# Patient Record
Sex: FEMALE | Race: WHITE | Employment: OTHER
[De-identification: names, ages, dates, MRNs, and addresses within clinical notes are randomized per-mention and may not be internally consistent; named-entity substitution may affect disease eponyms.]

---

## 2019-11-11 LAB
AVERAGE GLUCOSE: 298
CHOLESTEROL, TOTAL: 205 MG/DL
CHOLESTEROL/HDL RATIO: 4.3
CREATININE, URINE: 250.27
CREATININE: 0.7 MG/DL
HBA1C MFR BLD: 12 %
HDLC SERPL-MCNC: 48 MG/DL (ref 35–70)
LDL CHOLESTEROL CALCULATED: 118 MG/DL (ref 0–160)
MICROALBUMIN/CREAT 24H UR: 16.9 MG/G{CREAT}
MICROALBUMIN/CREAT UR-RTO: 67.5
POTASSIUM (K+): 4.4
TRIGL SERPL-MCNC: 194 MG/DL
VLDLC SERPL CALC-MCNC: 39 MG/DL

## 2020-05-12 VITALS
RESPIRATION RATE: 14 BRPM | WEIGHT: 187 LBS | DIASTOLIC BLOOD PRESSURE: 80 MMHG | HEART RATE: 78 BPM | SYSTOLIC BLOOD PRESSURE: 124 MMHG

## 2020-05-12 RX ORDER — SITAGLIPTIN AND METFORMIN HYDROCHLORIDE 1000; 50 MG/1; MG/1
1 TABLET, FILM COATED ORAL 2 TIMES DAILY WITH MEALS
COMMUNITY
End: 2020-11-19 | Stop reason: SDUPTHER

## 2020-05-12 RX ORDER — ALPRAZOLAM 0.5 MG/1
0.5 TABLET ORAL NIGHTLY PRN
COMMUNITY

## 2020-05-12 RX ORDER — SERTRALINE HYDROCHLORIDE 100 MG/1
100 TABLET, FILM COATED ORAL DAILY
COMMUNITY

## 2020-05-12 RX ORDER — METOPROLOL TARTRATE 50 MG/1
50 TABLET, FILM COATED ORAL 2 TIMES DAILY
COMMUNITY
End: 2021-04-01 | Stop reason: SDUPTHER

## 2020-05-12 RX ORDER — LISINOPRIL 2.5 MG/1
2.5 TABLET ORAL DAILY
COMMUNITY
End: 2020-11-19 | Stop reason: SDUPTHER

## 2020-05-12 RX ORDER — PRAVASTATIN SODIUM 20 MG
20 TABLET ORAL DAILY
COMMUNITY
End: 2020-11-19 | Stop reason: SDUPTHER

## 2020-05-12 RX ORDER — TRIAMTERENE AND HYDROCHLOROTHIAZIDE 37.5; 25 MG/1; MG/1
1 CAPSULE ORAL EVERY MORNING
COMMUNITY
End: 2020-11-19 | Stop reason: SDUPTHER

## 2020-11-19 RX ORDER — TRIAMTERENE AND HYDROCHLOROTHIAZIDE 37.5; 25 MG/1; MG/1
1 CAPSULE ORAL EVERY MORNING
Qty: 90 CAPSULE | Refills: 0 | Status: SHIPPED | OUTPATIENT
Start: 2020-11-19 | End: 2021-06-24 | Stop reason: SDUPTHER

## 2020-11-19 RX ORDER — PRAVASTATIN SODIUM 20 MG
20 TABLET ORAL DAILY
Qty: 90 TABLET | Refills: 0 | Status: SHIPPED | OUTPATIENT
Start: 2020-11-19 | End: 2021-03-04 | Stop reason: SDUPTHER

## 2020-11-19 RX ORDER — GLIPIZIDE 5 MG/1
5 TABLET, FILM COATED, EXTENDED RELEASE ORAL DAILY
COMMUNITY
End: 2020-11-19 | Stop reason: SDUPTHER

## 2020-11-19 RX ORDER — SITAGLIPTIN AND METFORMIN HYDROCHLORIDE 1000; 50 MG/1; MG/1
1 TABLET, FILM COATED ORAL DAILY
Qty: 90 TABLET | Refills: 0 | Status: SHIPPED | OUTPATIENT
Start: 2020-11-19 | End: 2021-04-12

## 2020-11-19 RX ORDER — LISINOPRIL 2.5 MG/1
2.5 TABLET ORAL DAILY
Qty: 90 TABLET | Refills: 0 | Status: SHIPPED | OUTPATIENT
Start: 2020-11-19 | End: 2021-03-04

## 2020-11-19 RX ORDER — GLIPIZIDE 5 MG/1
5 TABLET, FILM COATED, EXTENDED RELEASE ORAL DAILY
Qty: 90 TABLET | Refills: 0 | Status: SHIPPED | OUTPATIENT
Start: 2020-11-19 | End: 2021-03-04

## 2020-11-19 NOTE — TELEPHONE ENCOUNTER
pt sarah in needs to speak with someone about getting her medications refilled as soon as possible , would like to speak with Kwaku Tripathi if possible . . please contact pt [phone]

## 2021-02-08 ENCOUNTER — NURSE TRIAGE (OUTPATIENT)
Dept: OTHER | Facility: CLINIC | Age: 71
End: 2021-02-08

## 2021-02-08 ENCOUNTER — TELEPHONE (OUTPATIENT)
Dept: ADMINISTRATIVE | Age: 71
End: 2021-02-08

## 2021-02-08 DIAGNOSIS — R53.83 OTHER FATIGUE: ICD-10-CM

## 2021-02-08 DIAGNOSIS — R63.4 WEIGHT LOSS: ICD-10-CM

## 2021-02-08 DIAGNOSIS — E78.5 HYPERLIPIDEMIA, UNSPECIFIED HYPERLIPIDEMIA TYPE: ICD-10-CM

## 2021-02-08 DIAGNOSIS — E11.65 UNCONTROLLED TYPE 2 DIABETES MELLITUS WITH HYPERGLYCEMIA (HCC): ICD-10-CM

## 2021-02-08 DIAGNOSIS — I10 ESSENTIAL HYPERTENSION: Primary | ICD-10-CM

## 2021-02-08 NOTE — TELEPHONE ENCOUNTER
Pt called and scheduled an appt for 3/4. She said she is concerned with diabetes and does not feel right when taking Janumet twice a day. She is having dizziness, weight loss, shakiness, and has fallen. Her family is concerned. She said she has been seen within the past 2 years. Warm transfer to nurse triage. Nurse aware of appt scheduled.

## 2021-02-08 NOTE — TELEPHONE ENCOUNTER
Reason for Disposition   Pharmacy calling with prescription questions and triager unable to answer question   Patient sounds very sick or weak to the triager    Additional Information   Negative: Low blood glucose (< 70 mg/dL or 3.9 mmol/L) persists > 30 minutes AND using low blood sugar Care Advice     Unable to take sugar, Pt is not experiencing any signs or symptoms that she is concerned of.  Negative: Low blood sugar symptoms persist > 30 minutes AND using low blood sugar Care Advice     Unable to take sugar, Pt is not experiencing any signs or symptoms that she is concerned of. Answer Assessment - Initial Assessment Questions  1. NAME of MEDICATION: \"What medicine are you calling about? \"      Jennifer Devi. 2. QUESTION: Mayur Alonso is your question? \"      Pt has stopped taking two pills as prescribed because\" she states it makes her feel like she is in [place]. \" She hasn't had her sugar checked in slow long and wants to know if these prescription be changed. She made it clear she cannot afford insulin. 3. PRESCRIBING HCP: \"Who prescribed it? \" Reason: if prescribed by specialist, call should be referred to that group. Oralia Lehman MD.    4. SYMPTOMS: \"Do you have any symptoms? \"      Confused, dizziness, has fallen twice once looking under her car and once at the grocery store (unsure if its related to her medication janumet), gets very sleepy. \"Everything seems to have gone wrong. \"    5. SEVERITY: If symptoms are present, ask \"Are they mild, moderate or severe? \"      Moderate. 6. PREGNANCY:  \"Is there any chance that you are pregnant? \" \"When was your last menstrual period? \"      N/A    Answer Assessment - Initial Assessment Questions  1. SYMPTOMS: \"What symptoms are you concerned about? \"      Confusion, unsteady gait, and dizziness associated with taking her medications as prescribed. 2. ONSET:  \"When did the symptoms start? \"  This has been going on for a while and has gotten worse within the last 7-5 months. 3. BLOOD GLUCOSE: \"What is your blood glucose level? \"       Unable to take blood sugar right now. 4. USUAL RANGE: \"What is your blood glucose level usually? \" (e.g., usual fasting morning value, usual evening value)      Unsure. 5. TYPE 1 or 2:  \"Do you know what type of diabetes you have? \"  (e.g., Type 1, Type 2, Gestational; doesn't know)       Type 2.    6. INSULIN: \"Do you take insulin? \" \"What type of insulin(s) do you use? What is the mode of delivery? (syringe, pen; injection or pump) \"When did you last give yourself an insulin dose? \" (i.e., time or hours/minutes ago) \"How much did you give? \" (i.e., how many units)      N/A    7. DIABETES PILLS: \"Do you take any pills for your diabetes? \"      Janumet PO 2 tablets, glipizide. She is only taking one of the Janumet and has stopped taking glipizide. This is due to her concern of low blood sugar and not being able to afford it. 8. OTHER SYMPTOMS: \"Do you have any symptoms? \" (e.g., fever, frequent urination, difficulty breathing, vomiting)      Frequently peeing more than usual. Bottoms of her feet are numb. Her fingers feel like they are tingling. 9. LOW BLOOD GLUCOSE TREATMENT: \"What have you done so far to treat the low blood glucose level? \"      Is unsure of what her sugar is currently, not experiencing symptoms. 10. FOOD: \"When did you last eat or drink? \"        \"Ate an hour ago. \"    11. ALONE: Nadia Bill you alone right now or is someone with you? \"         States she is alone. Protocols used: MEDICATION QUESTION CALL-ADULT-[place], DIABETES - LOW BLOOD SUGAR-ADULT-[place]    Patient called Meaghan Fields at [facility] pre-service center [facility])  with red flag complaint. Brief description of triage: Medication quest, low blood sugar concern. Pt unable to take her blood sugar, as she doesn't have a glucometer or access to one.     Triage indicates for patient to [address] or office with PCP approval. Anika Miller NP made aware of situation and that Pt is unable to drive. NP recommended Pt be seen tomorrow by her PCP. Pt is agreeable to this plan. Care advice provided, patient verbalizes understanding; denies any other questions or concerns; instructed to call back for any new or worsening symptoms. Writer sent secure message to [facility] to schedule    Attention Provider: Thank you for allowing me to participate in the care of your patient. The patient was connected to triage in response to information provided to the [facility]. Please do not respond through this encounter as the response is not directed to a shared pool.

## 2021-02-09 DIAGNOSIS — E78.5 HYPERLIPIDEMIA, UNSPECIFIED HYPERLIPIDEMIA TYPE: ICD-10-CM

## 2021-02-09 DIAGNOSIS — R63.4 WEIGHT LOSS: ICD-10-CM

## 2021-02-09 DIAGNOSIS — E11.65 UNCONTROLLED TYPE 2 DIABETES MELLITUS WITH HYPERGLYCEMIA (HCC): ICD-10-CM

## 2021-02-09 DIAGNOSIS — R53.83 OTHER FATIGUE: ICD-10-CM

## 2021-02-09 LAB
ALBUMIN SERPL-MCNC: 4.1 G/DL (ref 3.5–5.2)
ALP BLD-CCNC: 98 U/L (ref 35–104)
ALT SERPL-CCNC: 23 U/L (ref 0–32)
ANION GAP SERPL CALCULATED.3IONS-SCNC: 14 MMOL/L (ref 7–16)
AST SERPL-CCNC: 27 U/L (ref 0–31)
BASOPHILS ABSOLUTE: 0.06 E9/L (ref 0–0.2)
BASOPHILS RELATIVE PERCENT: 0.8 % (ref 0–2)
BILIRUB SERPL-MCNC: 0.7 MG/DL (ref 0–1.2)
BUN BLDV-MCNC: 16 MG/DL (ref 8–23)
CALCIUM SERPL-MCNC: 10 MG/DL (ref 8.6–10.2)
CHLORIDE BLD-SCNC: 95 MMOL/L (ref 98–107)
CHOLESTEROL, TOTAL: 204 MG/DL (ref 0–199)
CO2: 24 MMOL/L (ref 22–29)
CREAT SERPL-MCNC: 0.5 MG/DL (ref 0.5–1)
EOSINOPHILS ABSOLUTE: 0.12 E9/L (ref 0.05–0.5)
EOSINOPHILS RELATIVE PERCENT: 1.6 % (ref 0–6)
GFR AFRICAN AMERICAN: >60
GFR NON-AFRICAN AMERICAN: >60 ML/MIN/1.73
GLUCOSE BLD-MCNC: 386 MG/DL (ref 74–99)
HBA1C MFR BLD: 12.3 % (ref 4–5.6)
HCT VFR BLD CALC: 46.8 % (ref 34–48)
HDLC SERPL-MCNC: 56 MG/DL
HEMOGLOBIN: 15.5 G/DL (ref 11.5–15.5)
IMMATURE GRANULOCYTES #: 0.03 E9/L
IMMATURE GRANULOCYTES %: 0.4 % (ref 0–5)
LDL CHOLESTEROL CALCULATED: 126 MG/DL (ref 0–99)
LYMPHOCYTES ABSOLUTE: 2.25 E9/L (ref 1.5–4)
LYMPHOCYTES RELATIVE PERCENT: 30.5 % (ref 20–42)
MCH RBC QN AUTO: 30.8 PG (ref 26–35)
MCHC RBC AUTO-ENTMCNC: 33.1 % (ref 32–34.5)
MCV RBC AUTO: 93 FL (ref 80–99.9)
MONOCYTES ABSOLUTE: 0.79 E9/L (ref 0.1–0.95)
MONOCYTES RELATIVE PERCENT: 10.7 % (ref 2–12)
NEUTROPHILS ABSOLUTE: 4.13 E9/L (ref 1.8–7.3)
NEUTROPHILS RELATIVE PERCENT: 56 % (ref 43–80)
PDW BLD-RTO: 12.3 FL (ref 11.5–15)
PLATELET # BLD: 161 E9/L (ref 130–450)
PMV BLD AUTO: 11.2 FL (ref 7–12)
POTASSIUM SERPL-SCNC: 4.6 MMOL/L (ref 3.5–5)
RBC # BLD: 5.03 E12/L (ref 3.5–5.5)
SODIUM BLD-SCNC: 133 MMOL/L (ref 132–146)
TOTAL PROTEIN: 8 G/DL (ref 6.4–8.3)
TRIGL SERPL-MCNC: 112 MG/DL (ref 0–149)
VLDLC SERPL CALC-MCNC: 22 MG/DL
WBC # BLD: 7.4 E9/L (ref 4.5–11.5)

## 2021-02-09 NOTE — TELEPHONE ENCOUNTER
Spoke to the patient, she is very noncompliant, she has not been seen for more than 1 year  Has not followed up has not had a blood work done, I told her if she feels worse go to the ER  I ordered blood work for her she promised she will do it today  If the sugar is dropping then she can cut back the Janumet to 1 a day  She has an office visit keep that go to ER or urgent care if worse be more compliant with follow-ups  I spoke to her for long time and all the question answered

## 2021-02-09 NOTE — TELEPHONE ENCOUNTER
Called patient to schedule to be seen per Nurse Triage. Patient stated it will be hard to get in today. She does need this appointment per Nurse Triage due to not feeling well. She needs to discuss medication and Hypoglycemia. Please contact patient.

## 2021-02-10 LAB — TSH SERPL DL<=0.05 MIU/L-ACNC: 2.1 UIU/ML (ref 0.27–4.2)

## 2021-02-11 ENCOUNTER — TELEPHONE (OUTPATIENT)
Dept: FAMILY MEDICINE CLINIC | Age: 71
End: 2021-02-11

## 2021-02-11 RX ORDER — INSULIN GLARGINE 100 [IU]/ML
10 INJECTION, SOLUTION SUBCUTANEOUS NIGHTLY
Qty: 5 PEN | Refills: 0 | Status: SHIPPED
Start: 2021-02-11 | End: 2021-07-26 | Stop reason: SDUPTHER

## 2021-02-11 NOTE — TELEPHONE ENCOUNTER
Patient called back she will be home all evening please call     [phone]    Last seen Visit date not found  Next appt 3/4/2021

## 2021-02-11 NOTE — TELEPHONE ENCOUNTER
I spoke to the patient A1c more than 12% start on Lantus 10 units at night prescription [facility] to [facility] she has a sister who can teach her who has been using that I offered her to have diabetic teaching at [address] [place] [address] she has no way to go there

## 2021-03-04 ENCOUNTER — OFFICE VISIT (OUTPATIENT)
Dept: FAMILY MEDICINE CLINIC | Age: 71
End: 2021-03-04
Payer: MEDICARE

## 2021-03-04 VITALS
HEART RATE: 78 BPM | WEIGHT: 167 LBS | BODY MASS INDEX: 26.84 KG/M2 | SYSTOLIC BLOOD PRESSURE: 130 MMHG | HEIGHT: 66 IN | OXYGEN SATURATION: 98 % | DIASTOLIC BLOOD PRESSURE: 80 MMHG

## 2021-03-04 DIAGNOSIS — Z91.81 AT HIGH RISK FOR FALLS: ICD-10-CM

## 2021-03-04 DIAGNOSIS — R63.4 WEIGHT LOSS: ICD-10-CM

## 2021-03-04 DIAGNOSIS — E11.65 UNCONTROLLED TYPE 2 DIABETES MELLITUS WITH HYPERGLYCEMIA (HCC): Primary | ICD-10-CM

## 2021-03-04 DIAGNOSIS — Z91.14 DRUG NONCOMPLIANCE: ICD-10-CM

## 2021-03-04 DIAGNOSIS — L73.9 FOLLICULITIS: ICD-10-CM

## 2021-03-04 DIAGNOSIS — E78.5 HYPERLIPIDEMIA, UNSPECIFIED HYPERLIPIDEMIA TYPE: ICD-10-CM

## 2021-03-04 DIAGNOSIS — F33.9 EPISODE OF RECURRENT MAJOR DEPRESSIVE DISORDER, UNSPECIFIED DEPRESSION EPISODE SEVERITY (HCC): ICD-10-CM

## 2021-03-04 DIAGNOSIS — I10 ESSENTIAL HYPERTENSION: ICD-10-CM

## 2021-03-04 PROCEDURE — G8400 PT W/DXA NO RESULTS DOC: HCPCS | Performed by: INTERNAL MEDICINE

## 2021-03-04 PROCEDURE — G8484 FLU IMMUNIZE NO ADMIN: HCPCS | Performed by: INTERNAL MEDICINE

## 2021-03-04 PROCEDURE — 3046F HEMOGLOBIN A1C LEVEL >9.0%: CPT | Performed by: INTERNAL MEDICINE

## 2021-03-04 PROCEDURE — G8417 CALC BMI ABV UP PARAM F/U: HCPCS | Performed by: INTERNAL MEDICINE

## 2021-03-04 PROCEDURE — G8427 DOCREV CUR MEDS BY ELIG CLIN: HCPCS | Performed by: INTERNAL MEDICINE

## 2021-03-04 PROCEDURE — 1090F PRES/ABSN URINE INCON ASSESS: CPT | Performed by: INTERNAL MEDICINE

## 2021-03-04 PROCEDURE — 4040F PNEUMOC VAC/ADMIN/RCVD: CPT | Performed by: INTERNAL MEDICINE

## 2021-03-04 PROCEDURE — 1123F ACP DISCUSS/DSCN MKR DOCD: CPT | Performed by: INTERNAL MEDICINE

## 2021-03-04 PROCEDURE — 3017F COLORECTAL CA SCREEN DOC REV: CPT | Performed by: INTERNAL MEDICINE

## 2021-03-04 PROCEDURE — 99215 OFFICE O/P EST HI 40 MIN: CPT | Performed by: INTERNAL MEDICINE

## 2021-03-04 PROCEDURE — 2022F DILAT RTA XM EVC RTNOPTHY: CPT | Performed by: INTERNAL MEDICINE

## 2021-03-04 PROCEDURE — 1036F TOBACCO NON-USER: CPT | Performed by: INTERNAL MEDICINE

## 2021-03-04 RX ORDER — BLOOD-GLUCOSE METER
1 KIT MISCELLANEOUS DAILY
Qty: 1 KIT | Refills: 0 | Status: SHIPPED | OUTPATIENT
Start: 2021-03-04

## 2021-03-04 RX ORDER — LISINOPRIL 5 MG/1
5 TABLET ORAL DAILY
Qty: 90 TABLET | Refills: 1 | Status: SHIPPED
Start: 2021-03-04 | End: 2021-06-24 | Stop reason: SDUPTHER

## 2021-03-04 RX ORDER — PEN NEEDLE, DIABETIC 31 GX5/16"
1 NEEDLE, DISPOSABLE MISCELLANEOUS DAILY
Qty: 100 EACH | Refills: 3 | Status: SHIPPED
Start: 2021-03-04 | End: 2021-07-26 | Stop reason: SDUPTHER

## 2021-03-04 RX ORDER — PRAVASTATIN SODIUM 20 MG
20 TABLET ORAL DAILY
Qty: 90 TABLET | Refills: 1 | Status: SHIPPED
Start: 2021-03-04 | End: 2021-06-24 | Stop reason: SDUPTHER

## 2021-03-04 RX ORDER — DOXYCYCLINE HYCLATE 100 MG
100 TABLET ORAL 2 TIMES DAILY
Qty: 20 TABLET | Refills: 0 | Status: SHIPPED | OUTPATIENT
Start: 2021-03-04 | End: 2021-03-14

## 2021-03-04 RX ORDER — LANCETS 30 GAUGE
1 EACH MISCELLANEOUS 2 TIMES DAILY
Qty: 200 EACH | Refills: 1 | Status: SHIPPED
Start: 2021-03-04 | End: 2021-08-02 | Stop reason: SDUPTHER

## 2021-03-04 RX ORDER — GLUCOSAMINE HCL/CHONDROITIN SU 500-400 MG
CAPSULE ORAL
Qty: 200 STRIP | Refills: 1 | Status: SHIPPED
Start: 2021-03-04 | End: 2021-08-26 | Stop reason: SDUPTHER

## 2021-03-04 SDOH — ECONOMIC STABILITY: TRANSPORTATION INSECURITY
IN THE PAST 12 MONTHS, HAS THE LACK OF TRANSPORTATION KEPT YOU FROM MEDICAL APPOINTMENTS OR FROM GETTING MEDICATIONS?: NO

## 2021-03-04 SDOH — ECONOMIC STABILITY: INCOME INSECURITY: HOW HARD IS IT FOR YOU TO PAY FOR THE VERY BASICS LIKE FOOD, HOUSING, MEDICAL CARE, AND HEATING?: NOT ASKED

## 2021-03-04 SDOH — ECONOMIC STABILITY: FOOD INSECURITY: WITHIN THE PAST 12 MONTHS, YOU WORRIED THAT YOUR FOOD WOULD RUN OUT BEFORE YOU GOT MONEY TO BUY MORE.: NEVER TRUE

## 2021-03-04 SDOH — ECONOMIC STABILITY: TRANSPORTATION INSECURITY
IN THE PAST 12 MONTHS, HAS LACK OF TRANSPORTATION KEPT YOU FROM MEETINGS, WORK, OR FROM GETTING THINGS NEEDED FOR DAILY LIVING?: NO

## 2021-03-04 ASSESSMENT — PATIENT HEALTH QUESTIONNAIRE - PHQ9
2. FEELING DOWN, DEPRESSED OR HOPELESS: 0
SUM OF ALL RESPONSES TO PHQ QUESTIONS 1-9: 0
SUM OF ALL RESPONSES TO PHQ QUESTIONS 1-9: 0
1. LITTLE INTEREST OR PLEASURE IN DOING THINGS: 0

## 2021-03-04 NOTE — PROGRESS NOTES
On the basis of positive falls risk screening, assessment and plan is as follows: home safety tips provided. Subjective:      This is a very noncompliant patient she does not follow-up in the office did not do the blood work last I saw was November 13, 2019 she did not come back since then    She does have depression she follows with a psychiatrist Dr. Kelvin Gibson she is on Zoloft and Wellbutrin    She did not do the blood work for long time finally she did the blood work which was ordered long time ago on February 9, 2021  Her A1c was 12.3 I called her and made the appointment for follow-up  She told me she had stopped some of the medication  She has stopped taking the cholesterol medication and the lisinopril    She came in today with her sister    I have talked to her on the phone few times she does not check her sugars at home sometimes the problem was discussed    She had a lesion on the kidney she had a CT abdomen pelvis December 2019 ordered by Dr. Murphy [place] scan report reviewed with the patient exophytic cyst lower pole left kidney patient denies any pain or hematuria she has not followed up with a urologist      She has been noncompliant for many years    She has noticed small ulcerated lesions on the back of the shoulder the arms she scratches them a lot    She has fallen once her foot got entangled in the carpet denies any dizziness no loss of balance       Chief Complaint   Patient presents with    Diabetes        Past Medical History:   Diagnosis Date    Hypertension     Noncompliance     Renal mass     Type 2 diabetes mellitus without complication ([facility] [address]     not controlled an noncompliant         Social History     Socioeconomic History    Marital status: Single     Spouse name: Not on file    Number of children: Not on file    Years of education: Not on file    Highest education level: Not on file   Occupational History    Not on file   Social Needs    Financial resource strain: Not on file    Food insecurity     Worry: Never true     Inability: Never true    Transportation needs     Medical: No     Non-medical: No   Tobacco Use    Smoking status: Never Smoker    Smokeless tobacco: Never Used   Substance and Sexual Activity    Alcohol use: Not Currently    Drug use: Never    Sexual activity: Not on file   Lifestyle    Physical activity     Days per week: Not on file     Minutes per session: Not on file    Stress: Not on file   Relationships    Social connections     Talks on phone: Not on file     Gets together: Not on file     Attends [employer] service: Not on file     Active member of club or organization: Not on file     Attends meetings of clubs or organizations: Not on file     Relationship status: Not on file    Intimate partner violence     Fear of current or ex partner: Not on file     Emotionally abused: Not on file     Physically abused: Not on file     Forced sexual activity: Not on file   Other Topics Concern    Not on file   Social History Narrative    Not on file        Past Surgical History:   Procedure Laterality Date    HYSTERECTOMY, TOTAL ABDOMINAL          Family History   Problem Relation Age of Onset    Other Mother         brain tumor  age 32        Allergies   Allergen Reactions    Glipizide      High doses cause side effects      Medrol [Methylprednisolone]      Raises blood pressure     Metformin And Related Diarrhea and Nausea Only     Only in high doses    Pcn [Penicillins]     Lipitor [Atorvastatin Calcium] Rash    Zocor [Simvastatin] Rash        ROS  No acute distress  Cardiac: Denies any chest pain or palpitation  No angina, no dyspnea, she is not very active she is mostly homebound  Respiratory: Denies any cough or shortness of breath, denies any chronic cough or night sweats  GI: No abdominal pain.  Denies any nausea vomiting or diarrhea has refused colonoscopy denies any blood in the stool no change in bowel habits  : Denies any dysuria frequency or hematuria  Complains of frequency urination  Neuro: No headache or dizziness  Endocrine: Diabetes type 2 not controlled because of noncompliance no follow-ups in the office does not take the medication does not watch the diet  Skin: Superficial excoriated areas on the back in the arm from scratching  Has lost 20 pounds of weight since her last visit  Denies any change in vision  Has seen ophthalmologist Dr. Savana Mc recently    Objective:    /80   Pulse 78   Ht 5' 6\" (1.676 m)   Wt 167 lb (75.8 kg)   SpO2 98%   BMI 26.95 kg/m²     Constitutional: Alert awake and oriented  Eyes: Pupils equal bilaterally. Extraocular muscles intact  Neck: no JVD adenopathy no bruit  No adenopathy, no thyromegaly  Heart:  RRR, no murmurs, gallops, or rubs. No S3 no gallop  Lungs:    no wheeze, rales or rhonchi  No crackles, good air entry bilaterally  Abd: bowel sounds present, nontender, nondistended, no masses no organomegaly  No masses felt no localized tenderness to deep palpation  Extrem:  No clubbing, cyanosis, or edema    Neuro: AAOx3,No Focal deficit  Psychological: no depression or anxiety   Feet exam diminished sensation plantar aspect of both feet  Pulses intact  No ulcers    Current Outpatient Medications   Medication Sig Dispense Refill    pravastatin (PRAVACHOL) 20 MG tablet Take 1 tablet by mouth daily 90 tablet 1    lisinopril (PRINIVIL;ZESTRIL) 5 MG tablet Take 1 tablet by mouth daily 90 tablet 1    doxycycline hyclate (VIBRA-TABS) 100 MG tablet Take 1 tablet by mouth 2 times daily for 10 days 20 tablet 0    mupirocin (BACTROBAN) 2 % ointment Apply 3 times daily. 1 Tube 1    Insulin Pen Needle (PEN NEEDLES 31GX5/16\") 31G X 8 MM MISC 1 each by Does not apply route daily 100 each 3    glucose monitoring kit (FREESTYLE) monitoring kit 1 kit by Does not apply route daily 1 kit 0    blood glucose monitor strips Test 2 times a day & as needed for symptoms of irregular blood glucose.  Dispense sufficient amount for indicated testing frequency plus additional to accommodate PRN testing needs. 200 strip 1    Lancets MISC 1 each by Does not apply route 2 times daily 200 each 1    insulin glargine (LANTUS SOLOSTAR) 100 UNIT/ML injection pen Inject 10 Units into the skin nightly 5 pen 0    sitaGLIPtan-metFORMIN (JANUMET)  MG per tablet Take 1 tablet by mouth daily 90 tablet 0    triamterene-hydroCHLOROthiazide (DYAZIDE) 37.5-25 MG per capsule Take 1 capsule by mouth every morning 90 capsule 0    metoprolol tartrate (LOPRESSOR) 50 MG tablet Take 50 mg by mouth 2 times daily      sertraline (ZOLOFT) 100 MG tablet Take 100 mg by mouth daily      buPROPion HCl (WELLBUTRIN XL PO) Take 100 mg by mouth 2 times daily      ALPRAZolam (XANAX) 0.5 MG tablet Take 0.5 mg by mouth nightly as needed for Sleep. No current facility-administered medications for this visit.          Last 3 BMP  Lab Results   Component Value Date/Time     02/09/2021 03:05 PM     02/27/2017 04:21 PM     07/18/2016 01:25 PM    K 4.6 02/09/2021 03:05 PM    K 4.4 11/11/2019    K 4.6 02/27/2017 04:21 PM    K 4.4 07/18/2016 01:25 PM    CL 95 (L) 02/09/2021 03:05 PM    CL 96 (L) 02/27/2017 04:21 PM    CL 98 07/18/2016 01:25 PM    CO2 24 02/09/2021 03:05 PM    CO2 25 02/27/2017 04:21 PM    CO2 26 07/18/2016 01:25 PM    BUN 16 02/09/2021 03:05 PM    BUN 17 02/27/2017 04:21 PM    BUN 14 07/18/2016 01:25 PM    CREATININE 0.5 02/09/2021 03:05 PM    CREATININE 0.7 11/11/2019    CREATININE 0.6 02/27/2017 04:21 PM    CREATININE 0.7 07/18/2016 01:25 PM    GLUCOSE 386 (H) 02/09/2021 03:05 PM    GLUCOSE 251 (H) 02/27/2017 04:21 PM    GLUCOSE 240 (H) 07/18/2016 01:25 PM    CALCIUM 10.0 02/09/2021 03:05 PM    CALCIUM 10.0 02/27/2017 04:21 PM    CALCIUM 9.9 07/18/2016 01:25 PM       Last 3 CMP:    Lab Results   Component Value Date/Time     02/09/2021 03:05 PM     02/27/2017 04:21 PM     07/18/2016 01:25 PM    K 4.6 02/09/2021 03:05 PM    K 4.4 11/11/2019    K 4.6 02/27/2017 04:21 PM    K 4.4 07/18/2016 01:25 PM    CL 95 (L) 02/09/2021 03:05 PM    CL 96 (L) 02/27/2017 04:21 PM    CL 98 07/18/2016 01:25 PM    CO2 24 02/09/2021 03:05 PM    CO2 25 02/27/2017 04:21 PM    CO2 26 07/18/2016 01:25 PM    BUN 16 02/09/2021 03:05 PM    BUN 17 02/27/2017 04:21 PM    BUN 14 07/18/2016 01:25 PM    CREATININE 0.5 02/09/2021 03:05 PM    CREATININE 0.7 11/11/2019    CREATININE 0.6 02/27/2017 04:21 PM    CREATININE 0.7 07/18/2016 01:25 PM    GLUCOSE 386 (H) 02/09/2021 03:05 PM    GLUCOSE 251 (H) 02/27/2017 04:21 PM    GLUCOSE 240 (H) 07/18/2016 01:25 PM    CALCIUM 10.0 02/09/2021 03:05 PM    CALCIUM 10.0 02/27/2017 04:21 PM    CALCIUM 9.9 07/18/2016 01:25 PM    PROT 8.0 02/09/2021 03:05 PM    PROT 7.8 02/27/2017 04:21 PM    PROT 7.6 07/18/2016 01:25 PM    LABALBU 4.1 02/09/2021 03:05 PM    LABALBU 4.2 02/27/2017 04:21 PM    LABALBU 4.1 07/18/2016 01:25 PM    BILITOT 0.7 02/09/2021 03:05 PM    BILITOT 0.7 02/27/2017 04:21 PM    BILITOT 0.8 07/18/2016 01:25 PM    ALKPHOS 98 02/09/2021 03:05 PM    ALKPHOS 63 02/27/2017 04:21 PM    ALKPHOS 55 07/18/2016 01:25 PM    AST 27 02/09/2021 03:05 PM    AST 38 (H) 02/27/2017 04:21 PM    AST 43 (H) 07/18/2016 01:25 PM    ALT 23 02/09/2021 03:05 PM    ALT 37 (H) 02/27/2017 04:21 PM    ALT 42 (H) 07/18/2016 01:25 PM        CBC:   Lab Results   Component Value Date/Time    WBC 7.4 02/09/2021 03:05 PM    RBC 5.03 02/09/2021 03:05 PM    HGB 15.5 02/09/2021 03:05 PM    HCT 46.8 02/09/2021 03:05 PM    MCV 93.0 02/09/2021 03:05 PM    MCH 30.8 02/09/2021 03:05 PM    MCHC 33.1 02/09/2021 03:05 PM    RDW 12.3 02/09/2021 03:05 PM     02/09/2021 03:05 PM    MPV 11.2 02/09/2021 03:05 PM       A1C:  Lab Results   Component Value Date/Time    LABA1C 12.3 (H) 02/09/2021 03:05 PM       Lipid panel:  Lab Results   Component Value Date    CHOL 204 02/09/2021    CHOL 205 11/11/2019    CHOL 191 02/27/2017    TRIG 112 02/09/2021    TRIG 194 11/11/2019    TRIG 270 02/27/2017    HDL 56 02/09/2021    HDL 48 11/11/2019    HDL 51 02/27/2017        Lab Results   Component Value Date/Time    PROT 8.0 02/09/2021 03:05 PM    PROT 7.8 02/27/2017 04:21 PM    PROT 7.6 07/18/2016 01:25 PM       No results found for: MG      Assessment. Beatrice Rocha was seen today for diabetes. Diagnoses and all orders for this visit:    Uncontrolled type 2 diabetes mellitus with hyperglycemia ([address]    At high risk for falls    Hyperlipidemia, unspecified hyperlipidemia type    Essential hypertension    Folliculitis    Weight loss    Episode of recurrent major depressive disorder, unspecified depression episode severity ([facility] [address]    Drug noncompliance    Other orders  -     pravastatin (PRAVACHOL) 20 MG tablet; Take 1 tablet by mouth daily  -     lisinopril (PRINIVIL;ZESTRIL) 5 MG tablet; Take 1 tablet by mouth daily  -     doxycycline hyclate (VIBRA-TABS) 100 MG tablet; Take 1 tablet by mouth 2 times daily for 10 days  -     mupirocin (BACTROBAN) 2 % ointment; Apply 3 times daily.  -     Insulin Pen Needle (PEN NEEDLES 31GX5/16\") 31G X 8 MM MISC; 1 each by Does not apply route daily  -     glucose monitoring kit (FREESTYLE) monitoring kit; 1 kit by Does not apply route daily  -     blood glucose monitor strips; Test 2 times a day & as needed for symptoms of irregular blood glucose. Dispense sufficient amount for indicated testing frequency plus additional to accommodate PRN testing needs. -     Lancets MISC; 1 each by Does not apply route 2 times daily       There is no problem list on file for this patient.       Plan: Diabetes not controlled because of noncompliance she stopped the medication, does not follow-up on the lab report, not watching the diet, not taking the medication as prescribed    She got Janumet recently take 1 a day  I added Lantus insulin 10 units at night  Diabetes teaching was done for long time  Complication of diabetes discussed in detail  Which includes blindness, renal failure, cardiovascular event, heart attacks, strokes,  She did not want to go for diabetic teaching to her nurse I taught her how to use the pen  Her sister has used [place] she knows how to use the pen she is going to help her out also  I told the patient if any problem come to office anytime I will teach her how to give the insulin during working hours    She stopped taking the pravastatin cholesterol is high resume the pravastatin new prescription was given    Blood pressure was controlled she stopped taking lisinopril resume diet      For the folliculitis doxycycline 100 mg twice daily patient allergic to penicillin  Bactroban ointment locally if no improvement will refer to dermatologist    She has lost weight because her teeth hurt very bad she is going for teeth extraction and the diabetes not controlled monitor weight at home follow back here in 6 weeks    Depression is controlled denies any suicidal ideation she follows with a psychiatrist regularly continue treatment as prescribed she denies any suicidal ideation    Labs reviewed  Hemoglobin A1c 12.3%  Cholesterol 204,  LDL should be near 70    BUN 16 creatinine 0.5 GFR greater than 60  Still doing okay for complication of diabetes with renal failure discussed    Preventive medicine mammogram recommended she declined at this time she is going for teeth extraction and she will do a better job with the sugar first      She refused a colonoscopy    She had multiple questions which were answered  Patient was counseled for long time  Her sister was present she had questions which were answered    Follow back in 6 weeks  Monitor blood sugars at home and bring the card with the numbers so I can reevaluate  I advised that she can call me after 1 week about the sugar numbers and I can adjust her insulin    Continue follow-up with the ophthalmologist    She denies any cardiac symptoms at this time          Return in about 2 months (around 5/4/2021).        Zeke Garibay MD  1:32 PM  3/4/2021     DE

## 2021-04-01 RX ORDER — METOPROLOL TARTRATE 50 MG/1
50 TABLET, FILM COATED ORAL 2 TIMES DAILY
Qty: 180 TABLET | Refills: 1 | Status: SHIPPED
Start: 2021-04-01 | End: 2021-06-24 | Stop reason: SDUPTHER

## 2021-04-12 RX ORDER — SITAGLIPTIN AND METFORMIN HYDROCHLORIDE 1000; 50 MG/1; MG/1
TABLET, FILM COATED ORAL
Qty: 90 TABLET | Refills: 0 | Status: SHIPPED
Start: 2021-04-12 | End: 2021-06-24 | Stop reason: SDUPTHER

## 2021-06-24 ENCOUNTER — OFFICE VISIT (OUTPATIENT)
Dept: FAMILY MEDICINE CLINIC | Age: 71
End: 2021-06-24
Payer: MEDICARE

## 2021-06-24 VITALS
BODY MASS INDEX: 29.77 KG/M2 | WEIGHT: 168 LBS | TEMPERATURE: 96.4 F | OXYGEN SATURATION: 98 % | HEART RATE: 75 BPM | DIASTOLIC BLOOD PRESSURE: 70 MMHG | SYSTOLIC BLOOD PRESSURE: 112 MMHG | HEIGHT: 63 IN

## 2021-06-24 DIAGNOSIS — I10 ESSENTIAL HYPERTENSION: ICD-10-CM

## 2021-06-24 DIAGNOSIS — E78.5 HYPERLIPIDEMIA, UNSPECIFIED HYPERLIPIDEMIA TYPE: ICD-10-CM

## 2021-06-24 DIAGNOSIS — N28.89 LEFT RENAL MASS: ICD-10-CM

## 2021-06-24 DIAGNOSIS — R05.9 COUGH: ICD-10-CM

## 2021-06-24 DIAGNOSIS — R63.4 WEIGHT LOSS: ICD-10-CM

## 2021-06-24 DIAGNOSIS — E11.40 TYPE 2 DIABETES MELLITUS WITH DIABETIC NEUROPATHY, WITHOUT LONG-TERM CURRENT USE OF INSULIN (HCC): Primary | ICD-10-CM

## 2021-06-24 DIAGNOSIS — N39.0 URINARY TRACT INFECTION WITHOUT HEMATURIA, SITE UNSPECIFIED: ICD-10-CM

## 2021-06-24 DIAGNOSIS — E11.40 TYPE 2 DIABETES MELLITUS WITH DIABETIC NEUROPATHY, WITHOUT LONG-TERM CURRENT USE OF INSULIN (HCC): ICD-10-CM

## 2021-06-24 DIAGNOSIS — E03.9 HYPOTHYROIDISM, UNSPECIFIED TYPE: ICD-10-CM

## 2021-06-24 LAB
BASOPHILS ABSOLUTE: 0.07 E9/L (ref 0–0.2)
BASOPHILS RELATIVE PERCENT: 0.7 % (ref 0–2)
EOSINOPHILS ABSOLUTE: 0.19 E9/L (ref 0.05–0.5)
EOSINOPHILS RELATIVE PERCENT: 1.9 % (ref 0–6)
HBA1C MFR BLD: 10.6 % (ref 4–5.6)
HCT VFR BLD CALC: 48.6 % (ref 34–48)
HEMOGLOBIN: 15.4 G/DL (ref 11.5–15.5)
IMMATURE GRANULOCYTES #: 0.04 E9/L
IMMATURE GRANULOCYTES %: 0.4 % (ref 0–5)
LYMPHOCYTES ABSOLUTE: 3.42 E9/L (ref 1.5–4)
LYMPHOCYTES RELATIVE PERCENT: 34.9 % (ref 20–42)
MCH RBC QN AUTO: 30.7 PG (ref 26–35)
MCHC RBC AUTO-ENTMCNC: 31.7 % (ref 32–34.5)
MCV RBC AUTO: 96.8 FL (ref 80–99.9)
MONOCYTES ABSOLUTE: 0.99 E9/L (ref 0.1–0.95)
MONOCYTES RELATIVE PERCENT: 10.1 % (ref 2–12)
NEUTROPHILS ABSOLUTE: 5.09 E9/L (ref 1.8–7.3)
NEUTROPHILS RELATIVE PERCENT: 52 % (ref 43–80)
PDW BLD-RTO: 12.7 FL (ref 11.5–15)
PLATELET # BLD: 184 E9/L (ref 130–450)
PMV BLD AUTO: 11.1 FL (ref 7–12)
RBC # BLD: 5.02 E12/L (ref 3.5–5.5)
WBC # BLD: 9.8 E9/L (ref 4.5–11.5)

## 2021-06-24 PROCEDURE — 3046F HEMOGLOBIN A1C LEVEL >9.0%: CPT | Performed by: INTERNAL MEDICINE

## 2021-06-24 PROCEDURE — G8417 CALC BMI ABV UP PARAM F/U: HCPCS | Performed by: INTERNAL MEDICINE

## 2021-06-24 PROCEDURE — 99214 OFFICE O/P EST MOD 30 MIN: CPT | Performed by: INTERNAL MEDICINE

## 2021-06-24 PROCEDURE — 3017F COLORECTAL CA SCREEN DOC REV: CPT | Performed by: INTERNAL MEDICINE

## 2021-06-24 PROCEDURE — 4040F PNEUMOC VAC/ADMIN/RCVD: CPT | Performed by: INTERNAL MEDICINE

## 2021-06-24 PROCEDURE — 1036F TOBACCO NON-USER: CPT | Performed by: INTERNAL MEDICINE

## 2021-06-24 PROCEDURE — G8400 PT W/DXA NO RESULTS DOC: HCPCS | Performed by: INTERNAL MEDICINE

## 2021-06-24 PROCEDURE — 1123F ACP DISCUSS/DSCN MKR DOCD: CPT | Performed by: INTERNAL MEDICINE

## 2021-06-24 PROCEDURE — 81000 URINALYSIS NONAUTO W/SCOPE: CPT | Performed by: INTERNAL MEDICINE

## 2021-06-24 PROCEDURE — G8427 DOCREV CUR MEDS BY ELIG CLIN: HCPCS | Performed by: INTERNAL MEDICINE

## 2021-06-24 PROCEDURE — 2022F DILAT RTA XM EVC RTNOPTHY: CPT | Performed by: INTERNAL MEDICINE

## 2021-06-24 PROCEDURE — 1090F PRES/ABSN URINE INCON ASSESS: CPT | Performed by: INTERNAL MEDICINE

## 2021-06-24 RX ORDER — TRIAMTERENE AND HYDROCHLOROTHIAZIDE 37.5; 25 MG/1; MG/1
1 CAPSULE ORAL EVERY MORNING
Qty: 90 CAPSULE | Refills: 0 | Status: SHIPPED
Start: 2021-06-24 | End: 2021-07-26 | Stop reason: SDUPTHER

## 2021-06-24 RX ORDER — PRAVASTATIN SODIUM 20 MG
20 TABLET ORAL DAILY
Qty: 90 TABLET | Refills: 1 | Status: SHIPPED
Start: 2021-06-24 | End: 2021-07-26 | Stop reason: SDUPTHER

## 2021-06-24 RX ORDER — SITAGLIPTIN AND METFORMIN HYDROCHLORIDE 1000; 50 MG/1; MG/1
1 TABLET, FILM COATED ORAL DAILY
Qty: 90 TABLET | Refills: 0 | Status: SHIPPED
Start: 2021-06-24 | End: 2021-07-26 | Stop reason: SDUPTHER

## 2021-06-24 RX ORDER — LISINOPRIL 5 MG/1
5 TABLET ORAL DAILY
Qty: 90 TABLET | Refills: 1 | Status: SHIPPED
Start: 2021-06-24 | End: 2021-07-26 | Stop reason: SDUPTHER

## 2021-06-24 RX ORDER — METOPROLOL TARTRATE 50 MG/1
50 TABLET, FILM COATED ORAL 2 TIMES DAILY
Qty: 180 TABLET | Refills: 1 | Status: SHIPPED
Start: 2021-06-24 | End: 2021-07-26 | Stop reason: SDUPTHER

## 2021-06-24 NOTE — PROGRESS NOTES
Subjective:     Chief Complaint   Patient presents with    Diabetes   Patient is here for follow-up on the diabetes  Patient is very noncompliant,  She has generalized osteoarthritis,  She was prescribed insulin last time, she has not taken that, her Sister Sergio Jamison told her how to use it but patient did not use it    She did not call me back, she canceled the appointment 6 weeks ago,  She is having problem with the teeth she would see the dentist she not eating well    Has problem with the vision going for cataract surgery saw the ophthalmologist    She has depression she is followed with Dr. Kobi Daniel and taking his medications      She lives alone at home, she has a sister Sergio Jamison who helps her      Last time her A1c was 12.3%, she was started on insulin she never used it      She was advised to get a glucometer  She says insurance did not cover that  She never called me with any problem  So she did not get the glucometer    He had declined to see the diabetic nurse I had taught her how to use it    She is not taking the [employer] regularly     She had a lesion on the kidney she had a CT abdomen pelvis December 2019 ordered by Dr. Rosi Carlson scan report reviewed with the patient exophytic cyst lower pole left kidney patient denies any pain or hematuria she has not followed up with a urologist    She has lost weight she has lost about 20 pounds in the last few months    Has been noncompliant for many years    She is scratching herself a lot because she has dry skin    She has not fallen since her last visit    Denies any dizziness or loss of balance                    Past Medical History:   Diagnosis Date    Hypertension     Noncompliance     Renal mass     Type 2 diabetes mellitus without complication ([address]     not controlled an noncompliant         Social History     Socioeconomic History    Marital status: Single     Spouse name: Not on file    Number of children: Not on file    Years of education: Not on file    Highest education level: Not on file   Occupational History    Not on file   Tobacco Use    Smoking status: Never Smoker    Smokeless tobacco: Never Used   Substance and Sexual Activity    Alcohol use: Not Currently    Drug use: Never    Sexual activity: Not on file   Other Topics Concern    Not on file   Social History Narrative    Not on file     Social Determinants of Health     Financial Resource Strain:     Difficulty of Paying Living Expenses:    Food Insecurity: No Food Insecurity    Worried About Running Out of Food in the Last Year: Never true    Aristides of Food in the Last Year: Never true   Transportation Needs: No Transportation Needs    Lack of Transportation (Medical): No    Lack of Transportation (Non-Medical):  No   Physical Activity:     Days of Exercise per Week:     Minutes of Exercise per Session:    Stress:     Feeling of Stress :    Social Connections:     Frequency of Communication with Friends and Family:     Frequency of Social Gatherings with Friends and Family:     Attends Denominational Services:     Active Member of Clubs or Organizations:     Attends Club or Organization Meetings:     Marital Status:    Intimate Partner Violence:     Fear of Current or Ex-Partner:     Emotionally Abused:     Physically Abused:     Sexually Abused:         Past Surgical History:   Procedure Laterality Date    HYSTERECTOMY, TOTAL ABDOMINAL          Family History   Problem Relation Age of Onset    Other Mother         brain tumor  age 32        Allergies   Allergen Reactions    Glipizide      High doses cause side effects      Medrol [Methylprednisolone]      Raises blood pressure     Metformin And Related Diarrhea and Nausea Only     Only in high doses    Pcn [Penicillins]     Lipitor [Atorvastatin Calcium] Rash    Zocor [Simvastatin] Rash        ROS  No acute distress  Cardiac: Denies any chest pain or palpitation  Denies any angina or dyspnea  Because of multiple risk factors advised to see a cardiologist patient declined  Respiratory: Denies any cough or shortness of breath  GI: Not eating well, appetite poor,  Has lost 20 pounds of weight,  Refusing colonoscopy,  Refusing to see a gastroenterologist  : Denies any dysuria frequency or hematuria  History of left renal lesion supposed to follow with Dr. Franchesca Medrano she has not followed up  She needs a further CAT scan to evaluate that lesion    Neuro: No headache or dizziness  Endocrine: History of diabetes uncontrolled patient very noncompliant  Skin: normal    Skin is very dry  She saw ophthalmologist and planning surgery for the cataract    Problem with the teeth she is seeing a dentist          Objective:    /70   Pulse 75   Temp 96.4 °F (35.8 °C)   Ht 5' 3\" (1.6 m)   Wt 168 lb (76.2 kg)   SpO2 98%   BMI 29.76 kg/m²     Constitutional: Alert awake and oriented  Eyes: Pupils equal bilaterally. Extraocular muscles intact  Neck: no JVD adenopathy no bruit    No adenopathy no bruit  Heart:  RRR, no murmurs, gallops, or rubs.   Lungs:    no wheeze, rales or rhonchi  Abd: bowel sounds present, nontender, nondistended, no masses    Extrem:  No clubbing, cyanosis, or edema  Neuro: AAOx3,No Focal deficit  Psychological: History of depression being followed by the psychiatrist      Very noncompliant with follow-ups, noncompliant with the medication,      Current Outpatient Medications   Medication Sig Dispense Refill    triamterene-hydroCHLOROthiazide (DYAZIDE) 37.5-25 MG per capsule Take 1 capsule by mouth every morning 90 capsule 0    pravastatin (PRAVACHOL) 20 MG tablet Take 1 tablet by mouth daily 90 tablet 1    metoprolol tartrate (LOPRESSOR) 50 MG tablet Take 1 tablet by mouth 2 times daily 180 tablet 1    lisinopril (PRINIVIL;ZESTRIL) 5 MG tablet Take 1 tablet by mouth daily 90 tablet 1    sitaGLIPtan-metFORMIN (JANUMET)  MG per tablet Take 1 tablet by mouth daily 90 tablet 0    Insulin Pen Needle (PEN NEEDLES 31GX5/16\") 31G X 8 MM MISC 1 each by Does not apply route daily 100 each 3    glucose monitoring kit (FREESTYLE) monitoring kit 1 kit by Does not apply route daily 1 kit 0    blood glucose monitor strips Test 2 times a day & as needed for symptoms of irregular blood glucose. Dispense sufficient amount for indicated testing frequency plus additional to accommodate PRN testing needs. 200 strip 1    Lancets MISC 1 each by Does not apply route 2 times daily 200 each 1    insulin glargine (LANTUS SOLOSTAR) 100 UNIT/ML injection pen Inject 10 Units into the skin nightly 5 pen 0    sertraline (ZOLOFT) 100 MG tablet Take 100 mg by mouth daily      buPROPion HCl (WELLBUTRIN XL PO) Take 100 mg by mouth 2 times daily      ALPRAZolam (XANAX) 0.5 MG tablet Take 0.5 mg by mouth nightly as needed for Sleep. No current facility-administered medications for this visit.         Last 3 BMP  Lab Results   Component Value Date/Time     02/09/2021 03:05 PM     02/27/2017 04:21 PM     07/18/2016 01:25 PM    K 4.6 02/09/2021 03:05 PM    K 4.4 11/11/2019 12:00 AM    K 4.6 02/27/2017 04:21 PM    K 4.4 07/18/2016 01:25 PM    CL 95 (L) 02/09/2021 03:05 PM    CL 96 (L) 02/27/2017 04:21 PM    CL 98 07/18/2016 01:25 PM    CO2 24 02/09/2021 03:05 PM    CO2 25 02/27/2017 04:21 PM    CO2 26 07/18/2016 01:25 PM    BUN 16 02/09/2021 03:05 PM    BUN 17 02/27/2017 04:21 PM    BUN 14 07/18/2016 01:25 PM    CREATININE 0.5 02/09/2021 03:05 PM    CREATININE 0.7 11/11/2019 12:00 AM    CREATININE 0.6 02/27/2017 04:21 PM    CREATININE 0.7 07/18/2016 01:25 PM    GLUCOSE 386 (H) 02/09/2021 03:05 PM    GLUCOSE 251 (H) 02/27/2017 04:21 PM    GLUCOSE 240 (H) 07/18/2016 01:25 PM    CALCIUM 10.0 02/09/2021 03:05 PM    CALCIUM 10.0 02/27/2017 04:21 PM    CALCIUM 9.9 07/18/2016 01:25 PM       Last 3 CMP:    Lab Results   Component Value Date/Time     02/09/2021 03:05 PM     02/27/2017 04:21 PM     07/18/2016 01:25 PM    K 4.6 02/09/2021 03:05 PM    K 4.4 11/11/2019 12:00 AM    K 4.6 02/27/2017 04:21 PM    K 4.4 07/18/2016 01:25 PM    CL 95 (L) 02/09/2021 03:05 PM    CL 96 (L) 02/27/2017 04:21 PM    CL 98 07/18/2016 01:25 PM    CO2 24 02/09/2021 03:05 PM    CO2 25 02/27/2017 04:21 PM    CO2 26 07/18/2016 01:25 PM    BUN 16 02/09/2021 03:05 PM    BUN 17 02/27/2017 04:21 PM    BUN 14 07/18/2016 01:25 PM    CREATININE 0.5 02/09/2021 03:05 PM    CREATININE 0.7 11/11/2019 12:00 AM    CREATININE 0.6 02/27/2017 04:21 PM    CREATININE 0.7 07/18/2016 01:25 PM    GLUCOSE 386 (H) 02/09/2021 03:05 PM    GLUCOSE 251 (H) 02/27/2017 04:21 PM    GLUCOSE 240 (H) 07/18/2016 01:25 PM    CALCIUM 10.0 02/09/2021 03:05 PM    CALCIUM 10.0 02/27/2017 04:21 PM    CALCIUM 9.9 07/18/2016 01:25 PM    PROT 8.0 02/09/2021 03:05 PM    PROT 7.8 02/27/2017 04:21 PM    PROT 7.6 07/18/2016 01:25 PM    LABALBU 4.1 02/09/2021 03:05 PM    LABALBU 4.2 02/27/2017 04:21 PM    LABALBU 4.1 07/18/2016 01:25 PM    BILITOT 0.7 02/09/2021 03:05 PM    BILITOT 0.7 02/27/2017 04:21 PM    BILITOT 0.8 07/18/2016 01:25 PM    ALKPHOS 98 02/09/2021 03:05 PM    ALKPHOS 63 02/27/2017 04:21 PM    ALKPHOS 55 07/18/2016 01:25 PM    AST 27 02/09/2021 03:05 PM    AST 38 (H) 02/27/2017 04:21 PM    AST 43 (H) 07/18/2016 01:25 PM    ALT 23 02/09/2021 03:05 PM    ALT 37 (H) 02/27/2017 04:21 PM    ALT 42 (H) 07/18/2016 01:25 PM        CBC:   Lab Results   Component Value Date/Time    WBC 7.4 02/09/2021 03:05 PM    RBC 5.03 02/09/2021 03:05 PM    HGB 15.5 02/09/2021 03:05 PM    HCT 46.8 02/09/2021 03:05 PM    MCV 93.0 02/09/2021 03:05 PM    MCH 30.8 02/09/2021 03:05 PM    MCHC 33.1 02/09/2021 03:05 PM    RDW 12.3 02/09/2021 03:05 PM     02/09/2021 03:05 PM    MPV 11.2 02/09/2021 03:05 PM       A1C:  Lab Results   Component Value Date/Time    LABA1C 12.3 (H) 02/09/2021 03:05 PM       Lipid panel:  Lab Results   Component Value Date    CHOL 204 02/09/2021    CHOL 205 11/11/2019    CHOL 191 02/27/2017    TRIG 112 02/09/2021    TRIG 194 11/11/2019    TRIG 270 02/27/2017    HDL 56 02/09/2021    HDL 48 11/11/2019    HDL 51 02/27/2017        Lab Results   Component Value Date/Time    PROT 8.0 02/09/2021 03:05 PM    PROT 7.8 02/27/2017 04:21 PM    PROT 7.6 07/18/2016 01:25 PM       No results found for: MG      Assessment. Bridger Foster was seen today for diabetes. Diagnoses and all orders for this visit:    Type 2 diabetes mellitus with diabetic neuropathy, without long-term current use of insulin ([facility] [address]  -     HEMOGLOBIN A1C; Future  -     [facility] Order for Diabetic Testing Supplies as OP  -     [facility] - Diabetes Education, [facility] [place] [facility]    Left renal mass  -     CT ABDOMEN PELVIS W IV CONTRAST Additional Contrast? Oral; Future  -     XR CHEST (2 VW); Future    Weight loss  -     CT ABDOMEN PELVIS W IV CONTRAST Additional Contrast? Oral; Future  -     XR CHEST (2 VW); Future  -     CBC WITH AUTO DIFFERENTIAL; Future  -     TSH; Future    Cough  -     XR CHEST (2 VW); Future    Essential hypertension    Hyperlipidemia, unspecified hyperlipidemia type  -     COMPREHENSIVE METABOLIC PANEL; Future  -     LIPID PANEL; Future    Hypothyroidism, unspecified type  -     TSH; Future  -     T4, FREE; Future    Other orders  -     triamterene-hydroCHLOROthiazide (DYAZIDE) 37.5-25 MG per capsule; Take 1 capsule by mouth every morning  -     pravastatin (PRAVACHOL) 20 MG tablet; Take 1 tablet by mouth daily  -     metoprolol tartrate (LOPRESSOR) 50 MG tablet; Take 1 tablet by mouth 2 times daily  -     lisinopril (PRINIVIL;ZESTRIL) 5 MG tablet; Take 1 tablet by mouth daily  -     sitaGLIPtan-metFORMIN (JANUMET)  MG per tablet; Take 1 tablet by mouth daily       There is no problem list on file for this patient.       Plan: Diabetes type 2 not controlled A1c checked today 11.6%,  She did not get the glucometer she says insurance company would not approve it I called the

## 2021-06-25 LAB
ALBUMIN SERPL-MCNC: 4.1 G/DL (ref 3.5–5.2)
ALP BLD-CCNC: 77 U/L (ref 35–104)
ALT SERPL-CCNC: 27 U/L (ref 0–32)
ANION GAP SERPL CALCULATED.3IONS-SCNC: 14 MMOL/L (ref 7–16)
AST SERPL-CCNC: 29 U/L (ref 0–31)
BILIRUB SERPL-MCNC: 0.6 MG/DL (ref 0–1.2)
BUN BLDV-MCNC: 24 MG/DL (ref 6–23)
CALCIUM SERPL-MCNC: 9.8 MG/DL (ref 8.6–10.2)
CHLORIDE BLD-SCNC: 96 MMOL/L (ref 98–107)
CHOLESTEROL, TOTAL: 174 MG/DL (ref 0–199)
CO2: 25 MMOL/L (ref 22–29)
CREAT SERPL-MCNC: 0.6 MG/DL (ref 0.5–1)
GFR AFRICAN AMERICAN: >60
GFR NON-AFRICAN AMERICAN: >60 ML/MIN/1.73
GLUCOSE BLD-MCNC: 285 MG/DL (ref 74–99)
HDLC SERPL-MCNC: 53 MG/DL
LDL CHOLESTEROL CALCULATED: 93 MG/DL (ref 0–99)
POTASSIUM SERPL-SCNC: 4.4 MMOL/L (ref 3.5–5)
SODIUM BLD-SCNC: 135 MMOL/L (ref 132–146)
T4 FREE: 1.4 NG/DL (ref 0.93–1.7)
TOTAL PROTEIN: 7.9 G/DL (ref 6.4–8.3)
TRIGL SERPL-MCNC: 139 MG/DL (ref 0–149)
TSH SERPL DL<=0.05 MIU/L-ACNC: 3.4 UIU/ML (ref 0.27–4.2)
VLDLC SERPL CALC-MCNC: 28 MG/DL

## 2021-06-26 LAB — URINE CULTURE, ROUTINE: NORMAL

## 2021-07-07 ENCOUNTER — TELEPHONE (OUTPATIENT)
Dept: FAMILY MEDICINE CLINIC | Age: 71
End: 2021-07-07

## 2021-07-07 ENCOUNTER — HOSPITAL ENCOUNTER (OUTPATIENT)
Dept: DIABETES SERVICES | Age: 71
Setting detail: THERAPIES SERIES
Discharge: HOME | End: 2021-07-07
Payer: MEDICARE

## 2021-07-07 PROCEDURE — G0108 DIAB MANAGE TRN  PER INDIV: HCPCS

## 2021-07-07 SDOH — ECONOMIC STABILITY: FOOD INSECURITY: ADDITIONAL INFORMATION: NO

## 2021-07-07 ASSESSMENT — PROBLEM AREAS IN DIABETES QUESTIONNAIRE (PAID)
COPING WITH COMPLICATIONS OF DIABETES: 1
FEELING SCARED WHEN YOU THINK ABOUT LIVING WITH DIABETES: 2
FEELING DEPRESSED WHEN YOU THINK ABOUT LIVING WITH DIABETES: 2
WORRYING ABOUT THE FUTURE AND THE POSSIBILITY OF SERIOUS COMPLICATIONS: 2
PAID-5 TOTAL SCORE: 8
FEELING THAT DIABETES IS TAKING UP TOO MUCH OF YOUR MENTAL AND PHYSICAL ENERGY EVERY DAY: 1

## 2021-07-07 NOTE — PROGRESS NOTES
Diabetes Self-Management Education Record    Participant Name: Varun Sanchez  Referring Provider: Lori Ferro MD  Assessment/Evaluation Ratings:  1=Needs Instruction   4=Demonstrates Understanding/Competency  2=Needs Review   NC=Not Covered    3=Comprehends Key Points  N/A=Not Applicable  Topics/Learning Objectives Pre-session Assess Date:  Instructor initials/date  21 PC Instr. Date  21 PC  Instructor initials/date Follow-up Post- session Eval Comments   Diabetes disease process & Treatment process:   -Define type of diabetes in simple terms.  - Describe the ABCs of  diabetes management  -Identify own type of diabetes  -Identify lifestyle changes/treatment options  -other:  2 [] All     []  []  []  []  []   21 PC  Hx of type 2 DM since around     Dr did teach her the meter but never tested    Sister with her and will over see her with her testing and Lantus Solostar pen for now   Developing strategies for Healthy coping/psychosocial issues:    -Describe feelings about living with diabetes  -Identify coping strategies and sources of stress  -Identify support needed & support network available  -Complete PAID-5 Diabetes questionnaire 2 [] All     []  []  []    []     Varun Sanchez completed a Diabetes Self- Management Education Assessment on 21. Part of our assessment is having the patient complete the PAID (Problem Areas in Diabetes Scale)-5 survey. This tool  measures diabetes-related emotional distress a patient may be feeling. Gwinda Apgar Sy scored _8_   A total score of >8 indicates possible diabetes related emotional distress, which warrants further assessment and a referral to mental health professional for psychological support and treatment.          Prevention, detection & treatment of Chronic complications:    -Identify the prevention, detection and treatment for complications including immunizations, preventive eye, foot, dental and renal exams as indicated per the participant's duration of diabetes and health status.  -Define the natural course of diabetes and the relationship of blood glucose levels to long term complications of diabetes. 2 [] All     []            []   7/7/21 PC  Stated neuropathy in hands. Hands are somewhat deformed and she kept dropping pen, alcohol wipes   Prevention, detection & treatment of acute complications:    -State the causes,signs & symptoms of hyper & hypoglycemia, and prevention & treatment strategies.   -Describe sick day guidelines  DKA /indications for ketone testing &  when to call physician  2 [] All     []      [x]    3 7/7/21 PC    Has has hyper and hypoglycemia               -Identify severe weather/situation crisis  & diabetes supplies management  []      Using medications safely:   -State effects of diabetes medicines on blood glucose levels;  -List diabetes medication taken, action & side effects 1 [x] All     []  []  2 7/7/21 PC  Instructed on Lantus Solostar insulin pen. .  Written/pictorial instructions used for session. Reviewed timing of insulin and importance of taking at the same time daily with rational.  Demo given and pt returned demo x 2. Needed verbal assist and shown how to hold the pen a few times. Bent the needle on the injection pillow. Stated she has VN 3 days a week now. Sister agrees to go to her home 6p-7p to help with insulin. Sister has done the insulin pen in the past and does understand steps and content reviewed. Pt overwhelmed even with guide. Not consistently dialing to 10 units. Site selection and rotation reviewed. Afraid needle will break off in her. Discussed. Needs to be overseen at this point. 2 hours spent with her    Janumet reviewed. To take with first meal.  Action reviewed. Not taking right or consistent at home.    Insulin/Injectables/glucagon  -Name appropriate injection sites; proper storage; supplies needed;  1   []  2     Demonstrate proper technique  []      Monitoring blood glucose, interpreting and using results:   -Identify the purpose of testing   -Identify recommended & personal blood glucose targets & HgbA1C target levels  -State the Importance of logging blood glucose levels for pattern recognition;   -State benefits of reading/using pt generated health data  -Verbalize safe lancet disposal 1 [x] All     []  []    []  []  []  2 21 PC  She did not bring her strips with her and has a few lancets. I called [facility] and they said they had refills for strips and lancets. Pt stated this is her 2nd meter so now unsure what refills for what meter they have. Demo given with my meter. Did show lancet device and used her written/pictorial guide and asked to use at home. Unsure if she will be able to do this. Having a hard time trying to get blood out. I used my  strips and her BG was 487 mg/dl. Dr. George Keep notified via . -Demonstrate proper testing technique  []      Incorporating physical activity into lifestyle:   -State effect of exercise on blood glucose levels;   -State benefits of regular exercise;   -Define safety considerations/food choices if needed.  -Describe contraindications/maintenance of activity. 1 [] All     []  []    []  []      Incorporating nutritional management into lifestyle:   -Describe effect of type, amount & timing of food on blood glucose  -Describe methods for preparing and planning healthy meals  -Correctly read food labels  -Name 3 foods high in Carbohydrate 2 [] All       []    []    []  []   21 PC    Encouraged 3 meals and hs snack. Consistent times if possible. Skips lunch. Inconsistent sleep schedule. Admits to not following dr instructions.   -Plan a carbohydrate-controlled meal based on individualized meal plan  -Demonstrate CHO counting/portion control   []  []      Developing strategies for problem solving to promote health/change behavior. -Identify 7 self-care behaviors; Personal health risk factors;  Benefits, challenges & strategies for behavioral change and set an individualized goal selection. 1       []   7/7/21 PC  []Nutrition  [x]Monitoring  []Exercise  []Medication  []Other     Identified Barriers to learning/adherence to self management plan:    None  []  other    Instruction Method:  Lecture/Discussion, Handouts and Return demonstration     Supporting Education Materials/Equipment Provided: Glucose Meter, Insulin Starter Kit and Survival Packet    []Thai materials       [] services     []Other:      Encounter Type Date Attended Start Time End Time Comments No Show Dates   Assessment          Session 1         Session 2        1:1 DSMES  7/7/21 PC 1100 1300  in person    In person Follow-up         Gestational Diabetes         Individual MNT        Meter Instrx        Insulin Instrx           Additional Comments: [] Pt seen individually due to Covid-19 Safety precautions and no group session available.         Date:   Follow-up goal attainment based on patients initial DSMES goal    Dr Notified by [x] EMR []Fax        []Post class Hgb A1C  []Medication compliance   []Plate method/meal plan compliance   []Able to state the number of Carbohydrate servings eaten at B,L,D   [x]Testing blood glucose as prescribed by PCP   []Exercise Routine   []Other:   []Other:     []Patient lost to follow-up  Dr Notified by []EMR []Fax     Personal Support Plan:      [x] Keep all scheduled doctor appointments   [] Make and keep appointments with specialists (foot, eye, dentist) as recommended   [] Consult my pharmacist about all new medications or to ask any medication questions   [] Get tested for sleep apnea   [] Seek help for:   [] Make an appointment with:   [] Attend smoking cessation classes or call 1-800-QUIT-NOW  [] Attend Diabetes Support Group   [] Use diabetes magazines, books, or credible web-sites like the ADA for more information  [] Increase exercise at home or join an exercise program:   [] Other:

## 2021-07-07 NOTE — LETTER
[facility] Diabetes Education DSMES Follow-up Letter    Name: Megan Montoya  :   1950    Follow-up plan/Date:   2021               Jose Luis Lucero     Dear Dr Sam Petit: Thank you for referring  Megan Montoya  to [facility] Diabetes Education Services. Megan Montoya  has completed their personalized comprehensive education plan. The education plan included the following topics: Diabetes Disease Process, Nutrition, Exercise, Glucose Monitoring, Acute and Chronic Complication, Behavioral and Lifestyle Change, Healthy Coping and goal setting. We contact participants 3 months after attending our services to review their progress on their chosen goal.      The following area was chosen:  X Monitoring     Selected goal outcome Post Education:     Patient states they met their goal 0% of time. 55 minute phone call with her. She could not get enough blood to put into strip at home. She was started on a [employer] on 10/28 at your office. She is overwhelmed with the reader and has never tested. The  was still in the box. I tried to talk her through it. The reader is reading that there is a sensor problem. I gave her the -[phone]# for customer support. She does not drive and could not come in. I asked that next time she needs the sensor changed to come in and I'll help her. Family came with her first visit. Thank you for referring this patient to our program. Please do not hesitate to call if we can be of any service for this patient.      Zunilda Hummel 476 or [phone] [facility]: [place]: [place]    [facility] Diabetes Education Department  American Diabetes Association Recognized [facility] Program

## 2021-07-07 NOTE — TELEPHONE ENCOUNTER
[facility] called to inform patient was admitted into home care and has an appt today w/Diabetes Education. Adam stated patient will be taking her glucometer w/her and will try to be more compliant.     Last seen 6/24/2021  Next appt 9/28/2021

## 2021-07-07 NOTE — LETTER
[facility])- Diabetes Education Department Initial Diabetes Education Letter    2021       Re:     Markell Gonsalez         :  1950  Dear Dr. Renetta Hadley: Thank you for referring your patient, Markell Gonsalez, for diabetes education. Anisa Dan had much difficulty with insulin pen and meter. Sister will oversee her as much as she can. Nursing/Medical [x]      Nutrition [x]  [x] Diabetes disease process & treatment   [x] Diabetes medication use and safety   [x] Self-monitoring blood glucose/interpreting results  [x] Prevention, detection and treatment of acute complications      [x] Nutritional management: basic principles          In addition, we provided the following services. [x]  Glucometer instruction. Blood glucose was 489 mg/dl with my  strips. Needs overseen. [x]  Insulin instruction with Lantus Solostar insulin pen. Bent needle numerous times. Dropped pen and alcohol wipes on the floor. Poor dexterity in both hands. PAID -5 (Problem Areas in Diabetes) Survey Results  8  A total score of >8 indicates possible diabetes related emotional distress which warrants further assessment. Comments: Office notified be voice mail    PATIENT SELECTED GOAL:   []  I will follow my meal plan and measure my carbohydrate foods. []  I will increase my activity to:  [x]  I will check my blood glucose as ordered by my doctor. []  I will take my medications at the correct times as ordered by my doctor.   []  Other:      DIABETES SELF-MANAGEMENT SUPPORT PLAN/REFERRALS (patient identified):  [x] Keep my scheduled visits with my doctor   [] Make and keep appointments with specialists (foot, eye, dentist) as recommended  [] Consult my pharmacist with all new medications and/or any medication questions  [] Get tested for sleep apnea  [] Seek help for:   [] Make an appointment with:   [] Attend smoking cessation classes or call help-line (QUIT-NOW; [phone])  [] Attend a diabetes support group  [] Use diabetes magazines, books, or the American Diabetes Association website (www.diabetes. org) for more information    [] Start or increase exercising at home or join a program:  [] Other: There will be a follow-up in 3 months to evaluate the attainment of their chosen goal, and self identified support plan and you will be notified of their progress. Thank you for referring this patient to our program.  Please do not hesitate to call if you have any questions at [phone] [facility] or [facility]) or [phone] (Birdena Hashimoto).         Sincerely,    [facility]) Diabetes Education Department  American Diabetes Association Recognized DSMES Program

## 2021-07-09 ENCOUNTER — TELEPHONE (OUTPATIENT)
Dept: FAMILY MEDICINE CLINIC | Age: 71
End: 2021-07-09

## 2021-07-09 NOTE — TELEPHONE ENCOUNTER
Radha Rondon 1723 called to ask if patient can switch the times of day when she takes her insulin and her Janumet because she has a difficult time seeing. Please contact Carla and patient.   Esthela Durand - [phone]  Patient - [phone]    Last seen 6/24/2021  Next appt 9/28/2021

## 2021-07-12 NOTE — TELEPHONE ENCOUNTER
Inform the nurse that patient can take insulin whenever is convenient for her and take the tablet when it is convenient for her

## 2021-07-12 NOTE — TELEPHONE ENCOUNTER
Okay to inform the patient and the nurse Gian Jeronimo phone number [phone]  Patient phone number [phone]

## 2021-07-21 ENCOUNTER — APPOINTMENT (OUTPATIENT)
Dept: GENERAL RADIOLOGY | Age: 71
End: 2021-07-21
Payer: MEDICARE

## 2021-07-21 ENCOUNTER — HOSPITAL ENCOUNTER (EMERGENCY)
Age: 71
Discharge: HOME | End: 2021-07-21
Attending: EMERGENCY MEDICINE
Payer: MEDICARE

## 2021-07-21 ENCOUNTER — TELEPHONE (OUTPATIENT)
Dept: FAMILY MEDICINE CLINIC | Age: 71
End: 2021-07-21

## 2021-07-21 VITALS
HEART RATE: 90 BPM | RESPIRATION RATE: 16 BRPM | WEIGHT: 160 LBS | SYSTOLIC BLOOD PRESSURE: 130 MMHG | OXYGEN SATURATION: 96 % | BODY MASS INDEX: 28.35 KG/M2 | HEIGHT: 63 IN | TEMPERATURE: 97.6 F | DIASTOLIC BLOOD PRESSURE: 85 MMHG

## 2021-07-21 DIAGNOSIS — R73.9 HYPERGLYCEMIA: Primary | ICD-10-CM

## 2021-07-21 LAB
ALBUMIN SERPL-MCNC: 3.6 G/DL (ref 3.5–5.2)
ALP BLD-CCNC: 73 U/L (ref 35–104)
ALT SERPL-CCNC: 29 U/L (ref 0–32)
ANION GAP SERPL CALCULATED.3IONS-SCNC: 10 MMOL/L (ref 7–16)
AST SERPL-CCNC: 26 U/L (ref 0–31)
B.E.: -1 MMOL/L
BACTERIA: ABNORMAL /HPF
BASOPHILS ABSOLUTE: 0.06 E9/L (ref 0–0.2)
BASOPHILS RELATIVE PERCENT: 0.8 % (ref 0–2)
BETA-HYDROXYBUTYRATE: 0.22 MMOL/L (ref 0.02–0.27)
BILIRUB SERPL-MCNC: 0.5 MG/DL (ref 0–1.2)
BILIRUBIN URINE: NEGATIVE
BLOOD, URINE: ABNORMAL
BUN BLDV-MCNC: 14 MG/DL (ref 6–23)
CALCIUM SERPL-MCNC: 9.3 MG/DL (ref 8.6–10.2)
CHLORIDE BLD-SCNC: 94 MMOL/L (ref 98–107)
CHP ED QC CHECK: YES
CLARITY: CLEAR
CO2: 28 MMOL/L (ref 22–29)
COHB: 1.5 % (ref 0–1.5)
COLOR: ABNORMAL
CREAT SERPL-MCNC: 0.5 MG/DL (ref 0.5–1)
CRITICAL: NORMAL
DATE ANALYZED: NORMAL
DATE OF COLLECTION: NORMAL
EKG ATRIAL RATE: 95 BPM
EKG P AXIS: -4 DEGREES
EKG P-R INTERVAL: 142 MS
EKG Q-T INTERVAL: 356 MS
EKG QRS DURATION: 86 MS
EKG QTC CALCULATION (BAZETT): 447 MS
EKG R AXIS: -36 DEGREES
EKG T AXIS: 1 DEGREES
EKG VENTRICULAR RATE: 95 BPM
EOSINOPHILS ABSOLUTE: 0.08 E9/L (ref 0.05–0.5)
EOSINOPHILS RELATIVE PERCENT: 1.1 % (ref 0–6)
GFR AFRICAN AMERICAN: >60
GFR NON-AFRICAN AMERICAN: >60 ML/MIN/1.73
GLUCOSE BLD-MCNC: 446 MG/DL
GLUCOSE BLD-MCNC: 448 MG/DL (ref 74–99)
GLUCOSE URINE: >=1000 MG/DL
HCO3: 25 MMOL/L
HCT VFR BLD CALC: 42.3 % (ref 34–48)
HEMOGLOBIN: 14.9 G/DL (ref 11.5–15.5)
HHB: 14.1 %
IMMATURE GRANULOCYTES #: 0.02 E9/L
IMMATURE GRANULOCYTES %: 0.3 % (ref 0–5)
KETONES, URINE: ABNORMAL MG/DL
LAB: NORMAL
LACTIC ACID: 2.3 MMOL/L (ref 0.5–2.2)
LEUKOCYTE ESTERASE, URINE: NEGATIVE
LYMPHOCYTES ABSOLUTE: 2.58 E9/L (ref 1.5–4)
LYMPHOCYTES RELATIVE PERCENT: 36.1 % (ref 20–42)
Lab: NORMAL
MCH RBC QN AUTO: 31.6 PG (ref 26–35)
MCHC RBC AUTO-ENTMCNC: 35.2 % (ref 32–34.5)
MCV RBC AUTO: 89.6 FL (ref 80–99.9)
METER GLUCOSE: 249 MG/DL (ref 74–99)
METER GLUCOSE: 319 MG/DL (ref 74–99)
METER GLUCOSE: 446 MG/DL (ref 74–99)
METHB: 0.3 % (ref 0–1.5)
MODE: NORMAL
MONOCYTES ABSOLUTE: 0.61 E9/L (ref 0.1–0.95)
MONOCYTES RELATIVE PERCENT: 8.5 % (ref 2–12)
NEUTROPHILS ABSOLUTE: 3.79 E9/L (ref 1.8–7.3)
NEUTROPHILS RELATIVE PERCENT: 53.2 % (ref 43–80)
NITRITE, URINE: NEGATIVE
O2 SATURATION: 85.6 %
O2HB: 84.1 %
OPERATOR ID: 30
PATIENT TEMP: 37 C
PCO2: 46.5 MMHG (ref 40–52)
PDW BLD-RTO: 11.8 FL (ref 11.5–15)
PH BLOOD GAS: 7.35 (ref 7.3–7.42)
PH UA: 6 (ref 5–9)
PLATELET # BLD: 124 E9/L (ref 130–450)
PMV BLD AUTO: 10.8 FL (ref 7–12)
PO2: 49.4 MMHG (ref 30–50)
POTASSIUM REFLEX MAGNESIUM: 3.9 MMOL/L (ref 3.5–5)
PROTEIN UA: NEGATIVE MG/DL
RBC # BLD: 4.72 E12/L (ref 3.5–5.5)
RBC UA: ABNORMAL /HPF (ref 0–2)
SODIUM BLD-SCNC: 132 MMOL/L (ref 132–146)
SOURCE, BLOOD GAS: NORMAL
SPECIFIC GRAVITY UA: 1.01 (ref 1–1.03)
THB: 15.6 G/DL (ref 11.5–16.5)
TIME ANALYZED: 1514
TOTAL PROTEIN: 7.1 G/DL (ref 6.4–8.3)
TROPONIN, HIGH SENSITIVITY: 35 NG/L (ref 0–9)
TROPONIN, HIGH SENSITIVITY: 37 NG/L (ref 0–9)
UROBILINOGEN, URINE: 0.2 E.U./DL
WBC # BLD: 7.1 E9/L (ref 4.5–11.5)
WBC UA: ABNORMAL /HPF (ref 0–5)

## 2021-07-21 PROCEDURE — 82010 KETONE BODYS QUAN: CPT

## 2021-07-21 PROCEDURE — 85025 COMPLETE CBC W/AUTO DIFF WBC: CPT

## 2021-07-21 PROCEDURE — 83605 ASSAY OF LACTIC ACID: CPT

## 2021-07-21 PROCEDURE — 82962 GLUCOSE BLOOD TEST: CPT

## 2021-07-21 PROCEDURE — 99284 EMERGENCY DEPT VISIT MOD MDM: CPT

## 2021-07-21 PROCEDURE — 6370000000 HC RX 637 (ALT 250 FOR IP): Performed by: EMERGENCY MEDICINE

## 2021-07-21 PROCEDURE — 80053 COMPREHEN METABOLIC PANEL: CPT

## 2021-07-21 PROCEDURE — 93005 ELECTROCARDIOGRAM TRACING: CPT | Performed by: STUDENT IN AN ORGANIZED HEALTH CARE EDUCATION/TRAINING PROGRAM

## 2021-07-21 PROCEDURE — 2580000003 HC RX 258: Performed by: STUDENT IN AN ORGANIZED HEALTH CARE EDUCATION/TRAINING PROGRAM

## 2021-07-21 PROCEDURE — 6370000000 HC RX 637 (ALT 250 FOR IP): Performed by: STUDENT IN AN ORGANIZED HEALTH CARE EDUCATION/TRAINING PROGRAM

## 2021-07-21 PROCEDURE — 36415 COLL VENOUS BLD VENIPUNCTURE: CPT

## 2021-07-21 PROCEDURE — 81001 URINALYSIS AUTO W/SCOPE: CPT

## 2021-07-21 PROCEDURE — 84484 ASSAY OF TROPONIN QUANT: CPT

## 2021-07-21 PROCEDURE — 96372 THER/PROPH/DIAG INJ SC/IM: CPT

## 2021-07-21 PROCEDURE — 93010 ELECTROCARDIOGRAM REPORT: CPT | Performed by: INTERNAL MEDICINE

## 2021-07-21 PROCEDURE — 71045 X-RAY EXAM CHEST 1 VIEW: CPT

## 2021-07-21 PROCEDURE — 82805 BLOOD GASES W/O2 SATURATION: CPT

## 2021-07-21 RX ORDER — 0.9 % SODIUM CHLORIDE 0.9 %
1000 INTRAVENOUS SOLUTION INTRAVENOUS ONCE
Status: COMPLETED | OUTPATIENT
Start: 2021-07-21 | End: 2021-07-21

## 2021-07-21 RX ADMIN — SODIUM CHLORIDE 1000 ML: 9 INJECTION, SOLUTION INTRAVENOUS at 15:53

## 2021-07-21 RX ADMIN — INSULIN HUMAN 5 UNITS: 100 INJECTION, SOLUTION PARENTERAL at 17:17

## 2021-07-21 RX ADMIN — INSULIN HUMAN 5 UNITS: 100 INJECTION, SOLUTION PARENTERAL at 16:20

## 2021-07-21 ASSESSMENT — ENCOUNTER SYMPTOMS
EYE PAIN: 0
BACK PAIN: 0
NAUSEA: 0
SORE THROAT: 0
WHEEZING: 0
SINUS PRESSURE: 0
ABDOMINAL DISTENTION: 0
VOMITING: 0
DIARRHEA: 0
EYE REDNESS: 0
COUGH: 0
EYE DISCHARGE: 0
SHORTNESS OF BREATH: 0

## 2021-07-21 ASSESSMENT — PAIN SCALES - GENERAL: PAINLEVEL_OUTOF10: 0

## 2021-07-21 NOTE — TELEPHONE ENCOUNTER
I spoke to the nurse Merari Roa [phone], I did send the patient to the ER, and patient went to the ER

## 2021-07-21 NOTE — CARE COORDINATION
BRUCE Consult. Pt here for high blood sugar. She states that she was recently switched to insulin and that she is not sure exactly how to go about testing her blood sugar and taking her medication. No Covid test.    BRUCE called Arjun Puente- Diabetic educator/dietician and reviewed pt's case. She noted pt received 2 hrs of diabetic education on 7/7/21, her sister did accompany her. Pt appeared overwhelmed and was afraid of the needles. She is using a lantus pen and had difficulty dialing in the dosage. Pt is eligible for 10 hrs of education per year for this new dx. Pt seemed to need oversight of her insulin mgt as she was not real good @ checking her BS either. If pt requires more education, no new order required. Arjun Puente will follow pt to see if she is admitted and if so, education can be completed in-pt. If not, she will contact pt to set up more out-pt education. BRUCE apprised Eric Dow DO of this information. BRUCE met w/pt in ED 14 and BRUCE explained role. Pt is single w/no children and resides alone last 38 yrs in her home. There are 4 steps in. Pt typically drives but has not done so since new dx of diabetes. She relies on her sister, Amber Ralph, for transport @ this time. Pt also has friend from [employer] who may help. She reports she has 7 siblings. PTA, pt is independent in ADL's and has insurance but she talks about not having [place] gumption\" to do anything. She talked about her house being unkept and laundry and things are piling up. Denies hx of ELAINA & currently active w/[facility]. No hx of 02. She reports she has not been sleeping well for years and even cancelled her PT this week as she is just too tired. She notes RN from [facility] told her today she needed to come to ED. PCP is Jose Maria Tavares and Pharmacy is [facility] on [address] but she also uses [facility] for 90 day supplies. Pt has following DME: cane. Pt is hopeful to go home today and will need to call her sister for ride.  1031 Buster Nair attempted to complete ACP w/pt but she is unsure @ this time of her code status. She noted she could benefit w/help in the home to clean but cannot afford to private pay- she only receives SS. SW did provided her w/following resources:  1) [employer]  2) Direction  3)Elder - resource #  4) [facility]  5) Copy of blank POA & [employer]. Sw did offer to complete either POS or LW today and pt declines. SW also informred pt about her receiving more diabetic eduction and she notes she is not always coherent and is amenable for more education. Advance Care Planning   Healthcare Decision Maker:    Primary Decision Maker: Wynema Dakin - Brother/Sister - [phone]    Click here to complete Healthcare Decision Makers including selection of the Healthcare Decision Maker Relationship (ie \"Primary\"). Today we documented Decision Maker(s) consistent with Legal Next of Kin hierarchy.     Electronically signed by HECTOR Diaz on 7/21/2021 at 5:06 PM

## 2021-07-21 NOTE — ED NOTES
Bed: 14  Expected date:   Expected time:   Means of arrival:   Comments:  Citlaly Dai RN  07/21/21 2279

## 2021-07-21 NOTE — TELEPHONE ENCOUNTER
Adam/ Home Care Nurse called from patient's home to inform patient's blood sugar is 483 and patient is shaky. Adam stated patient has not been taking her insulin as directed. I spoke w/GURPREET Judd who advised, per Dr. Tim Lewis, patient should go to ED. Informed Adam who was agreeable.   Adam can be reached at [phone]    Last seen 6/24/2021  Next appt 9/28/2021

## 2021-07-21 NOTE — ED PROVIDER NOTES
Patient presents with high blood sugar. She states that she was recently switched to insulin and that she is not sure exactly how to go about testing her blood sugar and taking her medication. She states that she has difficulty getting blood from her fingers and that she has not checked her blood sugar for the past couple days and due to not knowing her blood sugar she has not taken her medication. Patient is denying any symptoms currently. She does mention that she had a dry cough earlier this morning that however that has since resolved. EMS reports that her blood sugar was 491. She is currently denying any fevers, chills, shortness of breath, confusion, or fatigue. The history is provided by the patient and the EMS personnel. No  was used. Review of Systems   Constitutional: Negative for chills and fever. HENT: Negative for ear pain, sinus pressure and sore throat. Eyes: Negative for pain, discharge and redness. Respiratory: Negative for cough, shortness of breath and wheezing. Cardiovascular: Negative for chest pain. Gastrointestinal: Negative for abdominal distention, diarrhea, nausea and vomiting. Genitourinary: Negative for dysuria and frequency. Musculoskeletal: Negative for arthralgias and back pain. Skin: Negative for rash and wound. Neurological: Negative for weakness and headaches. Hematological: Negative for adenopathy. All other systems reviewed and are negative. Physical Exam  Vitals and nursing note reviewed. Constitutional:       General: She is not in acute distress. Appearance: She is well-developed. She is not ill-appearing. HENT:      Head: Normocephalic and atraumatic. Eyes:      Conjunctiva/sclera: Conjunctivae normal.   Cardiovascular:      Rate and Rhythm: Normal rate and regular rhythm. Heart sounds: Normal heart sounds. No murmur heard.      Pulmonary:      Effort: Pulmonary effort is normal. No respiratory distress. Breath sounds: Normal breath sounds. No wheezing or rales. Abdominal:      General: Bowel sounds are normal.      Palpations: Abdomen is soft. Tenderness: There is no abdominal tenderness. There is no guarding or rebound. Musculoskeletal:      Cervical back: Normal range of motion and neck supple. Skin:     General: Skin is warm and dry. Neurological:      Mental Status: She is alert and oriented to person, place, and time. Cranial Nerves: No cranial nerve deficit. Coordination: Coordination normal.          Procedures     MDM  Number of Diagnoses or Management Options  Hyperglycemia  Diagnosis management comments: Patient presents with hyperglycemia. She is currently asymptomatic. Patient has not been taking her medication appropriately. Diabetic education was provided. Glucose is currently 446. Labs are otherwise unremarkable. Patient is not in DKA. Fluids were given along with 5 units of insulin with improvement in her sugars to 249. There is not appear to be any emergent processes ongoing at this time. Patient was informed of results and plan and is agreeable. Patient will be discharged with instructions to follow-up with diabetic education and her PCP for further evaluation and care of her diabetes. Patient discharged home. Amount and/or Complexity of Data Reviewed  Clinical lab tests: reviewed  Tests in the radiology section of CPT®: reviewed  Tests in the medicine section of CPT®: reviewed         ED Course as of Jul 21 1519   Wed Jul 21, 2021   1446 ATTENDING PROVIDER ATTESTATION:     I have personally performed and/or participated in the history, exam, medical decision making, and procedures and agree with all pertinent clinical information unless otherwise noted. I have also reviewed and agree with the past medical, family and social history unless otherwise noted.     I have discussed this patient in detail with the resident, and provided the instruction and education regarding patient here for elevated blood sugar. Denies nausea vomiting or diarrhea. No fevers. No chest pain, palpitations or shortness of breath. No dysuria or flank pain or hematuria. Recently switched to insulin. States she is really not sure what she is doing with it, she states she has trouble because she cannot let it go too high or too low which to adjust her eating which has since made her not eat right and she just gets a little confused with the treatment herself. She does have visiting nurses to come out but they just are not coming out in the last week or 2. States that she could not get her sugar checked properly earlier and when the nurse checked it it was elevated so they sent her here. She has no actual physical complaints. My findings/plan: Patient resting comfortably in the bed in no acute distress. Heart rate regular, lungs are clear and equal.  Abdomen soft and nontender. No jaundice or icterus. No pretibial edema or calf pain. [NC]   6962 EKG shows normal sinus rhythm with a ventricular rate of 95 bpm. Left axis. There are no obvious ST elevations however there are T wave inversions in lead III. Comparable to previous EKG on 4/18/2018    [BB]      ED Course User Index  [BB] Quinton Kirby DO  [NC] Blair Sanford DO        ED Course as of Jul 22 1326   Wed Jul 21, 2021   1446 ATTENDING PROVIDER ATTESTATION:     I have personally performed and/or participated in the history, exam, medical decision making, and procedures and agree with all pertinent clinical information unless otherwise noted. I have also reviewed and agree with the past medical, family and social history unless otherwise noted. I have discussed this patient in detail with the resident, and provided the instruction and education regarding patient here for elevated blood sugar. Denies nausea vomiting or diarrhea. No fevers.   No chest pain, palpitations or shortness of breath. No dysuria or flank pain or hematuria. Recently switched to insulin. States she is really not sure what she is doing with it, she states she has trouble because she cannot let it go too high or too low which to adjust her eating which has since made her not eat right and she just gets a little confused with the treatment herself. She does have visiting nurses to come out but they just are not coming out in the last week or 2. States that she could not get her sugar checked properly earlier and when the nurse checked it it was elevated so they sent her here. She has no actual physical complaints. My findings/plan: Patient resting comfortably in the bed in no acute distress. Heart rate regular, lungs are clear and equal.  Abdomen soft and nontender. No jaundice or icterus. No pretibial edema or calf pain. [NC]   8054 EKG shows normal sinus rhythm with a ventricular rate of 95 bpm. Left axis. There are no obvious ST elevations however there are T wave inversions in lead III. Comparable to previous EKG on 4/18/2018    [BB]      ED Course User Index  [BB] Mel Saldivar DO  [NC] Emanuel Simental, DO       --------------------------------------------- PAST HISTORY ---------------------------------------------  Past Medical History:  has a past medical history of Hypertension, Noncompliance, Renal mass, and Type 2 diabetes mellitus without complication ([address] Past Surgical History:  has a past surgical history that includes Hysterectomy, total abdominal.    Social History:  reports that she has never smoked. She has never used smokeless tobacco. She reports previous alcohol use. She reports that she does not use drugs. Family History: family history includes Other in her mother. The patients home medications have been reviewed.     Allergies: Glipizide, Medrol [methylprednisolone], Metformin and related, Pcn [penicillins], Lipitor [atorvastatin calcium], and Zocor [simvastatin]    -------------------------------------------------- RESULTS -------------------------------------------------  Labs:  Results for orders placed or performed during the hospital encounter of 07/21/21   CBC Auto Differential   Result Value Ref Range    WBC 7.1 4.5 - 11.5 E9/L    RBC 4.72 3.50 - 5.50 E12/L    Hemoglobin 14.9 11.5 - 15.5 g/dL    Hematocrit 42.3 34.0 - 48.0 %    MCV 89.6 80.0 - 99.9 fL    MCH 31.6 26.0 - 35.0 pg    MCHC 35.2 (H) 32.0 - 34.5 %    RDW 11.8 11.5 - 15.0 fL    Platelets 474 (L) 908 - 450 E9/L    MPV 10.8 7.0 - 12.0 fL    Neutrophils % 53.2 43.0 - 80.0 %    Immature Granulocytes % 0.3 0.0 - 5.0 %    Lymphocytes % 36.1 20.0 - 42.0 %    Monocytes % 8.5 2.0 - 12.0 %    Eosinophils % 1.1 0.0 - 6.0 %    Basophils % 0.8 0.0 - 2.0 %    Neutrophils Absolute 3.79 1.80 - 7.30 E9/L    Immature Granulocytes # 0.02 E9/L    Lymphocytes Absolute 2.58 1.50 - 4.00 E9/L    Monocytes Absolute 0.61 0.10 - 0.95 E9/L    Eosinophils Absolute 0.08 0.05 - 0.50 E9/L    Basophils Absolute 0.06 0.00 - 0.20 E9/L   Comprehensive Metabolic Panel w/ Reflex to MG   Result Value Ref Range    Sodium 132 132 - 146 mmol/L    Potassium reflex Magnesium 3.9 3.5 - 5.0 mmol/L    Chloride 94 (L) 98 - 107 mmol/L    CO2 28 22 - 29 mmol/L    Anion Gap 10 7 - 16 mmol/L    Glucose 448 (H) 74 - 99 mg/dL    BUN 14 6 - 23 mg/dL    CREATININE 0.5 0.5 - 1.0 mg/dL    GFR Non-African American >60 >=60 mL/min/1.73    GFR African American >60     Calcium 9.3 8.6 - 10.2 mg/dL    Total Protein 7.1 6.4 - 8.3 g/dL    Albumin 3.6 3.5 - 5.2 g/dL    Total Bilirubin 0.5 0.0 - 1.2 mg/dL    Alkaline Phosphatase 73 35 - 104 U/L    ALT 29 0 - 32 U/L    AST 26 0 - 31 U/L   Troponin   Result Value Ref Range    Troponin, High Sensitivity 37 (H) 0 - 9 ng/L   Urinalysis, reflex to microscopic   Result Value Ref Range    Color, UA Straw Straw/Yellow    Clarity, UA Clear Clear    Glucose, Ur >=1000 (A) Negative mg/dL    Bilirubin Urine Negative Negative    Ketones, Urine TRACE (A) Negative mg/dL    Specific Gravity, UA 1.010 1.005 - 1.030    Blood, Urine TRACE (A) Negative    pH, UA 6.0 5.0 - 9.0    Protein, UA Negative Negative mg/dL    Urobilinogen, Urine 0.2 <2.0 E.U./dL    Nitrite, Urine Negative Negative    Leukocyte Esterase, Urine Negative Negative   Beta-Hydroxybutyrate   Result Value Ref Range    Beta-Hydroxybutyrate 0.22 0.02 - 0.27 mmol/L   Lactic Acid, Plasma   Result Value Ref Range    Lactic Acid 2.3 (H) 0.5 - 2.2 mmol/L   Microscopic Urinalysis   Result Value Ref Range    WBC, UA 0-1 0 - 5 /HPF    RBC, UA 0-1 0 - 2 /HPF    Bacteria, UA RARE (A) None Seen /HPF   Blood Gas, Arterial   Result Value Ref Range    Date Analyzed [id number]     Time Analyzed 1514     Source: Blood Venous     pH, Blood Gas 7.349 7.300 - 7.420    PCO2 46.5 40.0 - 52.0 mmHg    PO2 49.4 30.0 - 50.0 mmHg    HCO3 25.0 mmol/L    B.E. -1.0 mmol/L    O2 Sat 85.6 %    O2Hb 84.1 %    COHb 1.5 0.0 - 1.5 %    MetHb 0.3 0.0 - 1.5 %    HHb 14.1 %    tHb (est) 15.6 11.5 - 16.5 g/dL    Mode RA     Date Of Collection      Time Collected      Pt Temp 37.0 C     ID 30     Lab [id number]     Critical(s) Notified . No Critical Values    Troponin   Result Value Ref Range    Troponin, High Sensitivity 35 (H) 0 - 9 ng/L   POCT Glucose   Result Value Ref Range    Glucose 446 mg/dL    QC OK? yes    POCT Glucose   Result Value Ref Range    Meter Glucose 446 (H) 74 - 99 mg/dL   POCT Glucose   Result Value Ref Range    Meter Glucose 319 (H) 74 - 99 mg/dL   POCT Glucose   Result Value Ref Range    Meter Glucose 249 (H) 74 - 99 mg/dL   EKG 12 Lead   Result Value Ref Range    Ventricular Rate 95 BPM    Atrial Rate 95 BPM    P-R Interval 142 ms    QRS Duration 86 ms    Q-T Interval 356 ms    QTc Calculation (Bazett) 447 ms    P Axis -4 degrees    R Axis -36 degrees    T Axis 1 degrees       Radiology:  XR CHEST PORTABLE   Final Result   No acute process.              ------------------------- NURSING NOTES AND VITALS REVIEWED ---------------------------  Date / Time Roomed:  7/21/2021  2:02 PM  ED Bed Assignment:  14/14    The nursing notes within the ED encounter and vital signs as below have been reviewed. /85   Pulse 90   Temp 97.6 °F (36.4 °C) (Oral)   Resp 16   Ht 5' 3\" (1.6 m)   Wt 160 lb (72.6 kg)   SpO2 96%   BMI 28.34 kg/m²   Oxygen Saturation Interpretation: Normal      ------------------------------------------ PROGRESS NOTES ------------------------------------------  1:26 PM EDT  I have spoken with the patient and discussed todays results, in addition to providing specific details for the plan of care and counseling regarding the diagnosis and prognosis. Their questions are answered at this time and they are agreeable with the plan. I discussed at length with them reasons for immediate return here for re evaluation. They will followup with their primary care physician by calling their office tomorrow. --------------------------------- ADDITIONAL PROVIDER NOTES ---------------------------------  At this time the patient is without objective evidence of an acute process requiring hospitalization or inpatient management. They have remained hemodynamically stable throughout their entire ED visit and are stable for discharge with outpatient follow-up. The plan has been discussed in detail and they are aware of the specific conditions for emergent return, as well as the importance of follow-up. Discharge Medication List as of 7/21/2021  7:02 PM          Diagnosis:  1. Hyperglycemia        Disposition:  Patient's disposition: Discharge to home  Patient's condition is stable.     Patient was seen and evaluated by both myself and Skyla Lacey, [address] [place] [address],   Resident  07/22/21 1357

## 2021-07-21 NOTE — ED PROVIDER NOTES
ED Course as of Jul 21 1901 Wed Jul 21, 2021   1446 ATTENDING PROVIDER ATTESTATION:     I have personally performed and/or participated in the history, exam, medical decision making, and procedures and agree with all pertinent clinical information unless otherwise noted. I have also reviewed and agree with the past medical, family and social history unless otherwise noted. I have discussed this patient in detail with the resident, and provided the instruction and education regarding patient here for elevated blood sugar. Denies nausea vomiting or diarrhea. No fevers. No chest pain, palpitations or shortness of breath. No dysuria or flank pain or hematuria. Recently switched to insulin. States she is really not sure what she is doing with it, she states she has trouble because she cannot let it go too high or too low which to adjust her eating which has since made her not eat right and she just gets a little confused with the treatment herself. She does have visiting nurses to come out but they just are not coming out in the last week or 2. States that she could not get her sugar checked properly earlier and when the nurse checked it it was elevated so they sent her here. She has no actual physical complaints. My findings/plan: Patient resting comfortably in the bed in no acute distress. Heart rate regular, lungs are clear and equal.  Abdomen soft and nontender. No jaundice or icterus. No pretibial edema or calf pain. [NC]   9939 EKG shows normal sinus rhythm with a ventricular rate of 95 bpm. Left axis. There are no obvious ST elevations however there are T wave inversions in lead III.  Comparable to previous EKG on 4/18/2018    [BB]      ED Course User Index  [BB] Mya Jain DO  [NC] [facility], DO       --------------------------------------------- PAST HISTORY ---------------------------------------------  Past Medical History:  has a past medical history of Hypertension, Noncompliance, Renal mass, and Type 2 diabetes mellitus without complication ([facility] [address] Past Surgical History:  has a past surgical history that includes Hysterectomy, total abdominal.    Social History:  reports that she has never smoked. She has never used smokeless tobacco. She reports previous alcohol use. She reports that she does not use drugs. Family History: family history includes Other in her mother. The patients home medications have been reviewed.     Allergies: Glipizide, Medrol [methylprednisolone], Metformin and related, Pcn [penicillins], Lipitor [atorvastatin calcium], and Zocor [simvastatin]    -------------------------------------------------- RESULTS -------------------------------------------------  Labs:  Results for orders placed or performed during the hospital encounter of 07/21/21   CBC Auto Differential   Result Value Ref Range    WBC 7.1 4.5 - 11.5 E9/L    RBC 4.72 3.50 - 5.50 E12/L    Hemoglobin 14.9 11.5 - 15.5 g/dL    Hematocrit 42.3 34.0 - 48.0 %    MCV 89.6 80.0 - 99.9 fL    MCH 31.6 26.0 - 35.0 pg    MCHC 35.2 (H) 32.0 - 34.5 %    RDW 11.8 11.5 - 15.0 fL    Platelets 270 (L) 293 - 450 E9/L    MPV 10.8 7.0 - 12.0 fL    Neutrophils % 53.2 43.0 - 80.0 %    Immature Granulocytes % 0.3 0.0 - 5.0 %    Lymphocytes % 36.1 20.0 - 42.0 %    Monocytes % 8.5 2.0 - 12.0 %    Eosinophils % 1.1 0.0 - 6.0 %    Basophils % 0.8 0.0 - 2.0 %    Neutrophils Absolute 3.79 1.80 - 7.30 E9/L    Immature Granulocytes # 0.02 E9/L    Lymphocytes Absolute 2.58 1.50 - 4.00 E9/L    Monocytes Absolute 0.61 0.10 - 0.95 E9/L    Eosinophils Absolute 0.08 0.05 - 0.50 E9/L    Basophils Absolute 0.06 0.00 - 0.20 E9/L   Comprehensive Metabolic Panel w/ Reflex to MG   Result Value Ref Range    Sodium 132 132 - 146 mmol/L    Potassium reflex Magnesium 3.9 3.5 - 5.0 mmol/L    Chloride 94 (L) 98 - 107 mmol/L    CO2 28 22 - 29 mmol/L    Anion Gap 10 7 - 16 mmol/L    Glucose 448 (H) 74 - 99 mg/dL    BUN 14 6 - 23 mg/dL    CREATININE 0.5 0.5 - 1.0 mg/dL    GFR Non-African American >60 >=60 mL/min/1.73    GFR African American >60     Calcium 9.3 8.6 - 10.2 mg/dL    Total Protein 7.1 6.4 - 8.3 g/dL    Albumin 3.6 3.5 - 5.2 g/dL    Total Bilirubin 0.5 0.0 - 1.2 mg/dL    Alkaline Phosphatase 73 35 - 104 U/L    ALT 29 0 - 32 U/L    AST 26 0 - 31 U/L   Troponin   Result Value Ref Range    Troponin, High Sensitivity 37 (H) 0 - 9 ng/L   Urinalysis, reflex to microscopic   Result Value Ref Range    Color, UA Straw Straw/Yellow    Clarity, UA Clear Clear    Glucose, Ur >=1000 (A) Negative mg/dL    Bilirubin Urine Negative Negative    Ketones, Urine TRACE (A) Negative mg/dL    Specific Gravity, UA 1.010 1.005 - 1.030    Blood, Urine TRACE (A) Negative    pH, UA 6.0 5.0 - 9.0    Protein, UA Negative Negative mg/dL    Urobilinogen, Urine 0.2 <2.0 E.U./dL    Nitrite, Urine Negative Negative    Leukocyte Esterase, Urine Negative Negative   Beta-Hydroxybutyrate   Result Value Ref Range    Beta-Hydroxybutyrate 0.22 0.02 - 0.27 mmol/L   Lactic Acid, Plasma   Result Value Ref Range    Lactic Acid 2.3 (H) 0.5 - 2.2 mmol/L   Microscopic Urinalysis   Result Value Ref Range    WBC, UA 0-1 0 - 5 /HPF    RBC, UA 0-1 0 - 2 /HPF    Bacteria, UA RARE (A) None Seen /HPF   Blood Gas, Arterial   Result Value Ref Range    Date Analyzed [id number]     Time Analyzed 1514     Source: Blood Venous     pH, Blood Gas 7.349 7.300 - 7.420    PCO2 46.5 40.0 - 52.0 mmHg    PO2 49.4 30.0 - 50.0 mmHg    HCO3 25.0 mmol/L    B.E. -1.0 mmol/L    O2 Sat 85.6 %    O2Hb 84.1 %    COHb 1.5 0.0 - 1.5 %    MetHb 0.3 0.0 - 1.5 %    HHb 14.1 %    tHb (est) 15.6 11.5 - 16.5 g/dL    Mode RA     Date Of Collection      Time Collected      Pt Temp 37.0 C     ID 30     Lab [id number]     Critical(s) Notified .  No Critical Values    Troponin   Result Value Ref Range    Troponin, High Sensitivity 35 (H) 0 - 9 ng/L   POCT Glucose   Result Value Ref Range    Glucose 446 mg/dL QC OK? yes    POCT Glucose   Result Value Ref Range    Meter Glucose 446 (H) 74 - 99 mg/dL   POCT Glucose   Result Value Ref Range    Meter Glucose 319 (H) 74 - 99 mg/dL   POCT Glucose   Result Value Ref Range    Meter Glucose 249 (H) 74 - 99 mg/dL   EKG 12 Lead   Result Value Ref Range    Ventricular Rate 95 BPM    Atrial Rate 95 BPM    P-R Interval 142 ms    QRS Duration 86 ms    Q-T Interval 356 ms    QTc Calculation (Bazett) 447 ms    P Axis -4 degrees    R Axis -36 degrees    T Axis 1 degrees       Radiology:  XR CHEST PORTABLE   Final Result   No acute process. ------------------------- NURSING NOTES AND VITALS REVIEWED ---------------------------  Date / Time Roomed:  7/21/2021  2:02 PM  ED Bed Assignment:  14/14    The nursing notes within the ED encounter and vital signs as below have been reviewed. /85   Pulse 90   Temp 97.6 °F (36.4 °C) (Oral)   Resp 16   Ht 5' 3\" (1.6 m)   Wt 160 lb (72.6 kg)   SpO2 96%   BMI 28.34 kg/m²   Oxygen Saturation Interpretation: Normal      ------------------------------------------ PROGRESS NOTES ------------------------------------------  I have spoken with the patient and sister and discussed todays results, in addition to providing specific details for the plan of care and counseling regarding the diagnosis and prognosis. Their questions are answered at this time and they are agreeable with the plan. I discussed at length with them reasons for immediate return here for re evaluation. They will followup with primary care by calling their office tomorrow. --------------------------------- ADDITIONAL PROVIDER NOTES ---------------------------------  At this time the patient is without objective evidence of an acute process requiring hospitalization or inpatient management. They have remained hemodynamically stable throughout their entire ED visit and are stable for discharge with outpatient follow-up.      The plan has been discussed in

## 2021-07-22 ENCOUNTER — TELEPHONE (OUTPATIENT)
Dept: FAMILY MEDICINE CLINIC | Age: 71
End: 2021-07-22

## 2021-07-22 NOTE — TELEPHONE ENCOUNTER
Patient called stating BS have been up today fasting BS  296 in the a.m  Pt did take her insulin, no changes of insulin were made at ER on 7.21.21 Pt states she has hard time getting into the office.  Please advise     Last seen 6/24/2021  Next appt 9/28/2021

## 2021-07-26 ENCOUNTER — TELEPHONE (OUTPATIENT)
Dept: FAMILY MEDICINE CLINIC | Age: 71
End: 2021-07-26

## 2021-07-26 RX ORDER — TRIAMTERENE AND HYDROCHLOROTHIAZIDE 37.5; 25 MG/1; MG/1
1 CAPSULE ORAL EVERY MORNING
Qty: 90 CAPSULE | Refills: 0 | Status: SHIPPED
Start: 2021-07-26 | End: 2021-09-28 | Stop reason: SDUPTHER

## 2021-07-26 RX ORDER — LISINOPRIL 5 MG/1
5 TABLET ORAL DAILY
Qty: 90 TABLET | Refills: 1 | Status: SHIPPED
Start: 2021-07-26 | End: 2021-09-28 | Stop reason: SDUPTHER

## 2021-07-26 RX ORDER — SITAGLIPTIN AND METFORMIN HYDROCHLORIDE 1000; 50 MG/1; MG/1
1 TABLET, FILM COATED ORAL DAILY
Qty: 90 TABLET | Refills: 0 | Status: SHIPPED
Start: 2021-07-26 | End: 2021-09-28 | Stop reason: SDUPTHER

## 2021-07-26 RX ORDER — METOPROLOL TARTRATE 50 MG/1
50 TABLET, FILM COATED ORAL 2 TIMES DAILY
Qty: 180 TABLET | Refills: 1 | Status: SHIPPED
Start: 2021-07-26 | End: 2021-09-28 | Stop reason: SDUPTHER

## 2021-07-26 RX ORDER — PRAVASTATIN SODIUM 20 MG
20 TABLET ORAL DAILY
Qty: 90 TABLET | Refills: 1 | Status: SHIPPED
Start: 2021-07-26 | End: 2021-09-28 | Stop reason: SDUPTHER

## 2021-07-26 RX ORDER — PEN NEEDLE, DIABETIC 31 GX5/16"
1 NEEDLE, DISPOSABLE MISCELLANEOUS DAILY
Qty: 100 EACH | Refills: 3 | Status: SHIPPED
Start: 2021-07-26 | End: 2021-09-28 | Stop reason: SDUPTHER

## 2021-07-26 RX ORDER — INSULIN GLARGINE 100 [IU]/ML
10 INJECTION, SOLUTION SUBCUTANEOUS NIGHTLY
Qty: 5 PEN | Refills: 0 | Status: SHIPPED
Start: 2021-07-26 | End: 2021-09-28 | Stop reason: SDUPTHER

## 2021-07-26 NOTE — TELEPHONE ENCOUNTER
Gabby/ PT called to inform that patient is discharged as of today. Patient has not been seen since initial visit 7/15/21, due to patient cancelling each appt.     Last seen 6/24/2021  Next appt 9/28/2021

## 2021-07-27 ENCOUNTER — APPOINTMENT (OUTPATIENT)
Dept: GENERAL RADIOLOGY | Age: 71
End: 2021-07-27
Payer: MEDICARE

## 2021-07-27 ENCOUNTER — TELEPHONE (OUTPATIENT)
Dept: FAMILY MEDICINE CLINIC | Age: 71
End: 2021-07-27

## 2021-07-27 ENCOUNTER — HOSPITAL ENCOUNTER (EMERGENCY)
Age: 71
Discharge: HOME | End: 2021-07-27
Attending: EMERGENCY MEDICINE
Payer: MEDICARE

## 2021-07-27 VITALS
WEIGHT: 168 LBS | OXYGEN SATURATION: 97 % | HEIGHT: 63 IN | TEMPERATURE: 97 F | SYSTOLIC BLOOD PRESSURE: 122 MMHG | BODY MASS INDEX: 29.77 KG/M2 | HEART RATE: 104 BPM | DIASTOLIC BLOOD PRESSURE: 72 MMHG | RESPIRATION RATE: 14 BRPM

## 2021-07-27 DIAGNOSIS — R73.9 HYPERGLYCEMIA: ICD-10-CM

## 2021-07-27 DIAGNOSIS — L02.91 ABSCESS: Primary | ICD-10-CM

## 2021-07-27 LAB
ALBUMIN SERPL-MCNC: 3.9 G/DL (ref 3.5–5.2)
ALP BLD-CCNC: 83 U/L (ref 35–104)
ALT SERPL-CCNC: 24 U/L (ref 0–32)
ANION GAP SERPL CALCULATED.3IONS-SCNC: 13 MMOL/L (ref 7–16)
AST SERPL-CCNC: 23 U/L (ref 0–31)
BASOPHILS ABSOLUTE: 0.06 E9/L (ref 0–0.2)
BASOPHILS RELATIVE PERCENT: 0.6 % (ref 0–2)
BETA-HYDROXYBUTYRATE: 1.16 MMOL/L (ref 0.02–0.27)
BILIRUB SERPL-MCNC: 1 MG/DL (ref 0–1.2)
BUN BLDV-MCNC: 19 MG/DL (ref 6–23)
CALCIUM SERPL-MCNC: 9.5 MG/DL (ref 8.6–10.2)
CHLORIDE BLD-SCNC: 95 MMOL/L (ref 98–107)
CHP ED QC CHECK: NORMAL
CHP ED QC CHECK: YES
CO2: 25 MMOL/L (ref 22–29)
CREAT SERPL-MCNC: 0.5 MG/DL (ref 0.5–1)
EOSINOPHILS ABSOLUTE: 0.05 E9/L (ref 0.05–0.5)
EOSINOPHILS RELATIVE PERCENT: 0.5 % (ref 0–6)
GFR AFRICAN AMERICAN: >60
GFR NON-AFRICAN AMERICAN: >60 ML/MIN/1.73
GLUCOSE BLD-MCNC: 219 MG/DL
GLUCOSE BLD-MCNC: 353 MG/DL (ref 74–99)
GLUCOSE BLD-MCNC: 369 MG/DL
HCT VFR BLD CALC: 44.1 % (ref 34–48)
HEMOGLOBIN: 15.1 G/DL (ref 11.5–15.5)
IMMATURE GRANULOCYTES #: 0.05 E9/L
IMMATURE GRANULOCYTES %: 0.5 % (ref 0–5)
LYMPHOCYTES ABSOLUTE: 2.77 E9/L (ref 1.5–4)
LYMPHOCYTES RELATIVE PERCENT: 25.7 % (ref 20–42)
MCH RBC QN AUTO: 30.9 PG (ref 26–35)
MCHC RBC AUTO-ENTMCNC: 34.2 % (ref 32–34.5)
MCV RBC AUTO: 90.2 FL (ref 80–99.9)
METER GLUCOSE: 219 MG/DL (ref 74–99)
METER GLUCOSE: 369 MG/DL (ref 74–99)
MONOCYTES ABSOLUTE: 1.19 E9/L (ref 0.1–0.95)
MONOCYTES RELATIVE PERCENT: 11 % (ref 2–12)
NEUTROPHILS ABSOLUTE: 6.65 E9/L (ref 1.8–7.3)
NEUTROPHILS RELATIVE PERCENT: 61.7 % (ref 43–80)
PDW BLD-RTO: 12.2 FL (ref 11.5–15)
PH VENOUS: 7.37 (ref 7.35–7.45)
PLATELET # BLD: 156 E9/L (ref 130–450)
PMV BLD AUTO: 10.2 FL (ref 7–12)
POTASSIUM REFLEX MAGNESIUM: 4.8 MMOL/L (ref 3.5–5)
RBC # BLD: 4.89 E12/L (ref 3.5–5.5)
SODIUM BLD-SCNC: 133 MMOL/L (ref 132–146)
TOTAL PROTEIN: 7.8 G/DL (ref 6.4–8.3)
WBC # BLD: 10.8 E9/L (ref 4.5–11.5)

## 2021-07-27 PROCEDURE — 85025 COMPLETE CBC W/AUTO DIFF WBC: CPT

## 2021-07-27 PROCEDURE — 64450 NJX AA&/STRD OTHER PN/BRANCH: CPT

## 2021-07-27 PROCEDURE — 2580000003 HC RX 258: Performed by: STUDENT IN AN ORGANIZED HEALTH CARE EDUCATION/TRAINING PROGRAM

## 2021-07-27 PROCEDURE — 82010 KETONE BODYS QUAN: CPT

## 2021-07-27 PROCEDURE — 82962 GLUCOSE BLOOD TEST: CPT

## 2021-07-27 PROCEDURE — 82800 BLOOD PH: CPT

## 2021-07-27 PROCEDURE — 96374 THER/PROPH/DIAG INJ IV PUSH: CPT

## 2021-07-27 PROCEDURE — 87147 CULTURE TYPE IMMUNOLOGIC: CPT

## 2021-07-27 PROCEDURE — 96375 TX/PRO/DX INJ NEW DRUG ADDON: CPT

## 2021-07-27 PROCEDURE — 2500000003 HC RX 250 WO HCPCS: Performed by: STUDENT IN AN ORGANIZED HEALTH CARE EDUCATION/TRAINING PROGRAM

## 2021-07-27 PROCEDURE — 6360000002 HC RX W HCPCS: Performed by: STUDENT IN AN ORGANIZED HEALTH CARE EDUCATION/TRAINING PROGRAM

## 2021-07-27 PROCEDURE — 6370000000 HC RX 637 (ALT 250 FOR IP): Performed by: STUDENT IN AN ORGANIZED HEALTH CARE EDUCATION/TRAINING PROGRAM

## 2021-07-27 PROCEDURE — 90714 TD VACC NO PRESV 7 YRS+ IM: CPT | Performed by: STUDENT IN AN ORGANIZED HEALTH CARE EDUCATION/TRAINING PROGRAM

## 2021-07-27 PROCEDURE — 87186 SC STD MICRODIL/AGAR DIL: CPT

## 2021-07-27 PROCEDURE — 80053 COMPREHEN METABOLIC PANEL: CPT

## 2021-07-27 PROCEDURE — 99283 EMERGENCY DEPT VISIT LOW MDM: CPT

## 2021-07-27 PROCEDURE — 87070 CULTURE OTHR SPECIMN AEROBIC: CPT

## 2021-07-27 PROCEDURE — 73130 X-RAY EXAM OF HAND: CPT

## 2021-07-27 PROCEDURE — 90471 IMMUNIZATION ADMIN: CPT | Performed by: STUDENT IN AN ORGANIZED HEALTH CARE EDUCATION/TRAINING PROGRAM

## 2021-07-27 RX ORDER — 0.9 % SODIUM CHLORIDE 0.9 %
1000 INTRAVENOUS SOLUTION INTRAVENOUS ONCE
Status: COMPLETED | OUTPATIENT
Start: 2021-07-27 | End: 2021-07-27

## 2021-07-27 RX ORDER — DIAPER,BRIEF,INFANT-TODD,DISP
EACH MISCELLANEOUS ONCE
Status: COMPLETED | OUTPATIENT
Start: 2021-07-27 | End: 2021-07-27

## 2021-07-27 RX ORDER — CEPHALEXIN 500 MG/1
500 CAPSULE ORAL 4 TIMES DAILY
Qty: 28 CAPSULE | Refills: 0 | Status: SHIPPED | OUTPATIENT
Start: 2021-07-27 | End: 2021-08-03

## 2021-07-27 RX ORDER — SULFAMETHOXAZOLE AND TRIMETHOPRIM 800; 160 MG/1; MG/1
2 TABLET ORAL 2 TIMES DAILY
Qty: 40 TABLET | Refills: 0 | Status: SHIPPED | OUTPATIENT
Start: 2021-07-27 | End: 2021-08-06

## 2021-07-27 RX ADMIN — CLOSTRIDIUM TETANI TOXOID ANTIGEN (FORMALDEHYDE INACTIVATED) AND CORYNEBACTERIUM DIPHTHERIAE TOXOID ANTIGEN (FORMALDEHYDE INACTIVATED) 0.5 ML: 5; 2 INJECTION, SUSPENSION INTRAMUSCULAR at 17:08

## 2021-07-27 RX ADMIN — BACITRACIN ZINC 1 G: 500 OINTMENT TOPICAL at 17:01

## 2021-07-27 RX ADMIN — CEFAZOLIN 2000 MG: 10 INJECTION, POWDER, FOR SOLUTION INTRAVENOUS at 16:49

## 2021-07-27 RX ADMIN — INSULIN HUMAN 6 UNITS: 100 INJECTION, SOLUTION PARENTERAL at 17:14

## 2021-07-27 RX ADMIN — SODIUM CHLORIDE 1000 ML: 9 INJECTION, SOLUTION INTRAVENOUS at 15:41

## 2021-07-27 ASSESSMENT — ENCOUNTER SYMPTOMS
ABDOMINAL PAIN: 0
EYE PAIN: 0
BACK PAIN: 0
WHEEZING: 0
NAUSEA: 0
EYE DISCHARGE: 0
SORE THROAT: 0
EYE REDNESS: 0
ABDOMINAL DISTENTION: 0
SHORTNESS OF BREATH: 0
DIARRHEA: 0
COUGH: 0
VOMITING: 0
SINUS PRESSURE: 0

## 2021-07-27 ASSESSMENT — PAIN SCALES - GENERAL: PAINLEVEL_OUTOF10: 2

## 2021-07-27 ASSESSMENT — PAIN DESCRIPTION - PAIN TYPE: TYPE: ACUTE PAIN

## 2021-07-27 ASSESSMENT — PAIN DESCRIPTION - LOCATION: LOCATION: FINGER (COMMENT WHICH ONE)

## 2021-07-27 ASSESSMENT — PAIN DESCRIPTION - ORIENTATION: ORIENTATION: LEFT

## 2021-07-27 NOTE — ED PROVIDER NOTES
Chief Complaint   Patient presents with    Other     infected finger left hand, sent in by visiting nurse       Is a 41-year-old female presents today with wound to her left middle finger. States she was checking her blood  sugar the other day, noticed shortly after fingerstick started noticing swelling to this area which is continued to progress. Swelling has progressed from the nailbed shelter through the finger with associated erythema streaking behind. Symptoms continue to progress. Not made better or worse by anything specific. She has not tried any medication for the symptoms. Denies numbness, tingling or weakness. Denies fevers or chills. Patient also has a history of diabetes on insulin, has not taken insulin for the past 2 days as she has not checked her blood sugar. The history is provided by the patient. No  was used. Review of Systems   Constitutional: Negative for chills, diaphoresis and fever. HENT: Negative for ear pain, sinus pressure and sore throat. Eyes: Negative for pain, discharge and redness. Respiratory: Negative for cough, shortness of breath and wheezing. Cardiovascular: Negative for chest pain. Gastrointestinal: Negative for abdominal distention, abdominal pain, diarrhea, nausea and vomiting. Genitourinary: Negative for difficulty urinating, dysuria, flank pain and frequency. Musculoskeletal: Negative for arthralgias and back pain. Skin: Positive for rash and wound. Neurological: Negative for dizziness, syncope, weakness, numbness and headaches. Hematological: Negative for adenopathy. Psychiatric/Behavioral: Negative for behavioral problems and confusion. All other systems reviewed and are negative. Physical Exam  Vitals and nursing note reviewed. Constitutional:       Appearance: Normal appearance. She is well-developed. HENT:      Head: Normocephalic and atraumatic.    Eyes:      Pupils: Pupils are equal, round, and reactive to light. Cardiovascular:      Rate and Rhythm: Normal rate and regular rhythm. Pulses: Normal pulses. Heart sounds: Normal heart sounds. Comments: Radial pulses 2+ and strong bilaterally  Pulmonary:      Effort: Pulmonary effort is normal. No respiratory distress. Breath sounds: Normal breath sounds. No wheezing or rales. Abdominal:      General: Bowel sounds are normal.      Palpations: Abdomen is soft. Tenderness: There is no abdominal tenderness. There is no guarding or rebound. Musculoskeletal:      Cervical back: Normal range of motion and neck supple. Skin:     General: Skin is warm and dry. Capillary Refill: Capillary refill takes less than 2 seconds. Findings: Rash present. Comments: Patient has large abscess spreading from the nailbed shelter through the finger with associated erythema streaking posteriorly   Neurological:      General: No focal deficit present. Mental Status: She is alert and oriented to person, place, and time. Cranial Nerves: No cranial nerve deficit. Sensory: No sensory deficit. Coordination: Coordination normal.          Procedures     Incision and Drainage Procedure Note    Indication: Abscess    Procedure: The patient was positioned appropriately and the skin over the incision site was prepped with betadine and draped in a sterile fashion. Local anesthesia was obtained with a full digital block of the left long (middle) finger using 1% Lidocaine without epinephrine. An incision was then made over the greatest area of fluctuance and approximately 3 cc of thick material was expressed. Loculations were not present. The drainage cavity was then packed with sterile gauze. The patients tetanus status was updated with a tetanus booster. The patient tolerated the procedure well.     Complications: None        Labs Reviewed   CBC WITH AUTO DIFFERENTIAL - Abnormal; Notable for the following components: Result Value    Monocytes Absolute 1.19 (*)     All other components within normal limits   COMPREHENSIVE METABOLIC PANEL W/ REFLEX TO MG FOR LOW K - Abnormal; Notable for the following components:    Chloride 95 (*)     Glucose 353 (*)     All other components within normal limits   BETA-HYDROXYBUTYRATE - Abnormal; Notable for the following components:    Beta-Hydroxybutyrate 1.16 (*)     All other components within normal limits   POCT GLUCOSE - Abnormal; Notable for the following components:    Meter Glucose 369 (*)     All other components within normal limits   POCT GLUCOSE - Normal   CULTURE, WOUND   PH, VENOUS   POCT GLUCOSE     XR HAND LEFT (MIN 3 VIEWS)   Final Result   No acute osseous abnormality. Soft tissue swelling, distal 3rd finger. MDM  Number of Diagnoses or Management Options  Abscess  Hyperglycemia  Diagnosis management comments: Is a 47-year-old female presents today for wound to left middle finger. Distal exam shows evidence of large abscess. X-ray was obtained which shows no evidence of gas. Area was anesthetized, incision and drainage was performed with copious amount of pus returned. Area was well cleaned and sterile dressing was placed. Patient was given IV antibiotic without department. Labs were also obtained as she is hyperglycemic, glucose was elevated, however she is not in DKA. She was given insulin in the department. With patient being afebrile, no markers of sepsis, patient will be discharged home with double antibiotic coverage for abscess and is instructed to follow-up with her PCP. Strict return precautions are given. Patient understands agrees with plan. Vitals are stable discharge.        Amount and/or Complexity of Data Reviewed  Clinical lab tests: reviewed  Tests in the radiology section of CPT®: reviewed                --------------------------------------------- PAST HISTORY ---------------------------------------------  Past Medical History:  has a past medical history of Hypertension, Noncompliance, Renal mass, and Type 2 diabetes mellitus without complication ([facility] [address] Past Surgical History:  has a past surgical history that includes Hysterectomy, total abdominal.    Social History:  reports that she has never smoked. She has never used smokeless tobacco. She reports previous alcohol use. She reports that she does not use drugs. Family History: family history includes Other in her mother. The patients home medications have been reviewed.     Allergies: Glipizide, Medrol [methylprednisolone], Metformin and related, Pcn [penicillins], Lipitor [atorvastatin calcium], and Zocor [simvastatin]    -------------------------------------------------- RESULTS -------------------------------------------------  Labs:  Results for orders placed or performed during the hospital encounter of 07/27/21   CBC Auto Differential   Result Value Ref Range    WBC 10.8 4.5 - 11.5 E9/L    RBC 4.89 3.50 - 5.50 E12/L    Hemoglobin 15.1 11.5 - 15.5 g/dL    Hematocrit 44.1 34.0 - 48.0 %    MCV 90.2 80.0 - 99.9 fL    MCH 30.9 26.0 - 35.0 pg    MCHC 34.2 32.0 - 34.5 %    RDW 12.2 11.5 - 15.0 fL    Platelets 648 817 - 341 E9/L    MPV 10.2 7.0 - 12.0 fL    Neutrophils % 61.7 43.0 - 80.0 %    Immature Granulocytes % 0.5 0.0 - 5.0 %    Lymphocytes % 25.7 20.0 - 42.0 %    Monocytes % 11.0 2.0 - 12.0 %    Eosinophils % 0.5 0.0 - 6.0 %    Basophils % 0.6 0.0 - 2.0 %    Neutrophils Absolute 6.65 1.80 - 7.30 E9/L    Immature Granulocytes # 0.05 E9/L    Lymphocytes Absolute 2.77 1.50 - 4.00 E9/L    Monocytes Absolute 1.19 (H) 0.10 - 0.95 E9/L    Eosinophils Absolute 0.05 0.05 - 0.50 E9/L    Basophils Absolute 0.06 0.00 - 0.20 E9/L   Comprehensive Metabolic Panel w/ Reflex to MG   Result Value Ref Range    Sodium 133 132 - 146 mmol/L    Potassium reflex Magnesium 4.8 3.5 - 5.0 mmol/L    Chloride 95 (L) 98 - 107 mmol/L    CO2 25 22 - 29 mmol/L    Anion Gap 13 7 - 16 mmol/L    Glucose 353 (H) 74 - 99 mg/dL    BUN 19 6 - 23 mg/dL    CREATININE 0.5 0.5 - 1.0 mg/dL    GFR Non-African American >60 >=60 mL/min/1.73    GFR African American >60     Calcium 9.5 8.6 - 10.2 mg/dL    Total Protein 7.8 6.4 - 8.3 g/dL    Albumin 3.9 3.5 - 5.2 g/dL    Total Bilirubin 1.0 0.0 - 1.2 mg/dL    Alkaline Phosphatase 83 35 - 104 U/L    ALT 24 0 - 32 U/L    AST 23 0 - 31 U/L   pH, venous   Result Value Ref Range    pH, Bartolome 7.37 7.35 - 7.45   Beta-Hydroxybutyrate   Result Value Ref Range    Beta-Hydroxybutyrate 1.16 (H) 0.02 - 0.27 mmol/L   POCT Glucose   Result Value Ref Range    Glucose 369 mg/dL    QC OK? YES    POCT Glucose   Result Value Ref Range    Meter Glucose 369 (H) 74 - 99 mg/dL       Radiology:  XR HAND LEFT (MIN 3 VIEWS)   Final Result   No acute osseous abnormality. Soft tissue swelling, distal 3rd finger. ------------------------- NURSING NOTES AND VITALS REVIEWED ---------------------------  Date / Time Roomed:  7/27/2021  1:58 PM  ED Bed Assignment:  05/05    The nursing notes within the ED encounter and vital signs as below have been reviewed. /72   Pulse 104   Temp 97 °F (36.1 °C) (Temporal)   Resp 14   Ht 5' 3\" (1.6 m)   Wt 168 lb (76.2 kg)   SpO2 97%   BMI 29.76 kg/m²   Oxygen Saturation Interpretation: Normal      ------------------------------------------ PROGRESS NOTES ------------------------------------------  I have spoken with the patient and discussed todays results, in addition to providing specific details for the plan of care and counseling regarding the diagnosis and prognosis. Their questions are answered at this time and they are agreeable with the plan. I discussed at length with them reasons for immediate return here for re evaluation. They will followup with primary care by calling their office tomorrow.       --------------------------------- ADDITIONAL PROVIDER NOTES ---------------------------------  At this time the patient is without objective evidence of an acute process requiring hospitalization or inpatient management. They have remained hemodynamically stable throughout their entire ED visit and are stable for discharge with outpatient follow-up. The plan has been discussed in detail and they are aware of the specific conditions for emergent return, as well as the importance of follow-up. New Prescriptions    CEPHALEXIN (KEFLEX) 500 MG CAPSULE    Take 1 capsule by mouth 4 times daily for 7 days    SULFAMETHOXAZOLE-TRIMETHOPRIM (BACTRIM DS;SEPTRA DS) 800-160 MG PER TABLET    Take 2 tablets by mouth 2 times daily for 10 days       Diagnosis:  1. Abscess    2. Hyperglycemia        Disposition:  Patient's disposition: Discharge to home  Patient's condition is stable.             Ellen Dawkins,   Resident  07/27/21 6010

## 2021-07-27 NOTE — TELEPHONE ENCOUNTER
[facility] called seeing pt today letting you know she is sending pt to ER she has a large blood blister on the finger going to her second knuckle and looks infected.     Last seen 6/24/2021  Next appt 9/28/2021

## 2021-07-29 LAB
ORGANISM: ABNORMAL
WOUND/ABSCESS: ABNORMAL

## 2021-07-30 ENCOUNTER — TELEPHONE (OUTPATIENT)
Dept: FAMILY MEDICINE CLINIC | Age: 71
End: 2021-07-30

## 2021-07-30 NOTE — TELEPHONE ENCOUNTER
[address] [facility] called from patient's home to inform patient is out of lancets and has not checked her blood sugar. Informed that Dr. Thea Timmons is gone for the weekend and advised to contact local pharmacy for emergency supply. April was agreeable.      Last seen 6/24/2021  Next appt 9/28/2021  [facility]/[place]uan[place]

## 2021-07-30 NOTE — TELEPHONE ENCOUNTER
----- Message from Jeff Rouse sent at 7/29/2021  2:49 PM EDT -----  Subject: Message to Provider    QUESTIONS  Information for Provider? Patient needs to be called back to talk about   her medication refill she stated someone told her she had to call and   accept for it to be refilled, for mailing. Please call back today  ---------------------------------------------------------------------------  --------------  CALL BACK INFO  What is the best way for the office to contact you? OK to leave message on   voicemail  Preferred Call Back Phone Number? [phone]  ---------------------------------------------------------------------------  --------------  SCRIPT ANSWERS  Relationship to Patient?  Self

## 2021-08-02 RX ORDER — LANCETS 30 GAUGE
1 EACH MISCELLANEOUS 2 TIMES DAILY
Qty: 200 EACH | Refills: 1 | Status: SHIPPED
Start: 2021-08-02 | End: 2021-09-28 | Stop reason: SDUPTHER

## 2021-08-06 ENCOUNTER — TELEPHONE (OUTPATIENT)
Dept: FAMILY MEDICINE CLINIC | Age: 71
End: 2021-08-06

## 2021-08-06 NOTE — TELEPHONE ENCOUNTER
----- Message from Jana Hutton sent at 8/6/2021  2:58 PM EDT -----  Subject: Message to Provider    QUESTIONS  Information for Provider? wants to extend her home health and visit   frequency call darion  ---------------------------------------------------------------------------  --------------  [address]  What is the best way for the office to contact you? OK to leave message on   voicemail  Preferred Call Back Phone Number? [phone]  ---------------------------------------------------------------------------  --------------  SCRIPT ANSWERS  Relationship to Patient?  Third Party  Representative Name? Ashish Izquierdo

## 2021-08-09 ENCOUNTER — TELEPHONE (OUTPATIENT)
Dept: FAMILY MEDICINE CLINIC | Age: 71
End: 2021-08-09

## 2021-08-09 DIAGNOSIS — H54.7 DIMINISHED VISION: ICD-10-CM

## 2021-08-09 DIAGNOSIS — E11.65 UNCONTROLLED TYPE 2 DIABETES MELLITUS WITH HYPERGLYCEMIA (HCC): ICD-10-CM

## 2021-08-09 NOTE — TELEPHONE ENCOUNTER
[address] Home Health Nurse called to inform that patient is having a difficult time checking her blood glucose and she's wanting to know if Dr. Krysten Cortes would order the [facility].     Last seen 6/24/2021  Next appt 9/28/2021

## 2021-08-10 RX ORDER — FLASH GLUCOSE SENSOR
KIT MISCELLANEOUS
Qty: 6 EACH | Refills: 0 | Status: SHIPPED | OUTPATIENT
Start: 2021-08-10

## 2021-08-10 RX ORDER — FLASH GLUCOSE SCANNING READER
EACH MISCELLANEOUS
Qty: 1 DEVICE | Refills: 0 | Status: SHIPPED | OUTPATIENT
Start: 2021-08-10

## 2021-08-24 NOTE — TELEPHONE ENCOUNTER
Pt called Rx needs to go to [facility].       Last seen 6/24/2021  Next appt 9/28/2021

## 2021-08-26 RX ORDER — GLUCOSAMINE HCL/CHONDROITIN SU 500-400 MG
CAPSULE ORAL
Qty: 200 STRIP | Refills: 1 | Status: SHIPPED
Start: 2021-08-26 | End: 2022-01-12 | Stop reason: SDUPTHER

## 2021-09-28 ENCOUNTER — OFFICE VISIT (OUTPATIENT)
Dept: FAMILY MEDICINE CLINIC | Age: 71
End: 2021-09-28
Payer: MEDICARE

## 2021-09-28 VITALS
TEMPERATURE: 95 F | HEART RATE: 83 BPM | BODY MASS INDEX: 30.29 KG/M2 | DIASTOLIC BLOOD PRESSURE: 70 MMHG | WEIGHT: 171 LBS | OXYGEN SATURATION: 98 % | SYSTOLIC BLOOD PRESSURE: 122 MMHG

## 2021-09-28 DIAGNOSIS — R26.9 GAIT ABNORMALITY: ICD-10-CM

## 2021-09-28 DIAGNOSIS — E78.5 HYPERLIPIDEMIA, UNSPECIFIED HYPERLIPIDEMIA TYPE: ICD-10-CM

## 2021-09-28 DIAGNOSIS — I10 ESSENTIAL HYPERTENSION: ICD-10-CM

## 2021-09-28 DIAGNOSIS — E11.65 UNCONTROLLED TYPE 2 DIABETES MELLITUS WITH HYPERGLYCEMIA (HCC): Primary | ICD-10-CM

## 2021-09-28 DIAGNOSIS — N28.9 RENAL LESION: ICD-10-CM

## 2021-09-28 DIAGNOSIS — R63.4 WEIGHT LOSS: ICD-10-CM

## 2021-09-28 LAB — HBA1C MFR BLD: 9 %

## 2021-09-28 PROCEDURE — 4040F PNEUMOC VAC/ADMIN/RCVD: CPT | Performed by: INTERNAL MEDICINE

## 2021-09-28 PROCEDURE — 99214 OFFICE O/P EST MOD 30 MIN: CPT | Performed by: INTERNAL MEDICINE

## 2021-09-28 PROCEDURE — 2022F DILAT RTA XM EVC RTNOPTHY: CPT | Performed by: INTERNAL MEDICINE

## 2021-09-28 PROCEDURE — 3017F COLORECTAL CA SCREEN DOC REV: CPT | Performed by: INTERNAL MEDICINE

## 2021-09-28 PROCEDURE — G8427 DOCREV CUR MEDS BY ELIG CLIN: HCPCS | Performed by: INTERNAL MEDICINE

## 2021-09-28 PROCEDURE — G8400 PT W/DXA NO RESULTS DOC: HCPCS | Performed by: INTERNAL MEDICINE

## 2021-09-28 PROCEDURE — 1123F ACP DISCUSS/DSCN MKR DOCD: CPT | Performed by: INTERNAL MEDICINE

## 2021-09-28 PROCEDURE — 1036F TOBACCO NON-USER: CPT | Performed by: INTERNAL MEDICINE

## 2021-09-28 PROCEDURE — 3052F HG A1C>EQUAL 8.0%<EQUAL 9.0%: CPT | Performed by: INTERNAL MEDICINE

## 2021-09-28 PROCEDURE — 1090F PRES/ABSN URINE INCON ASSESS: CPT | Performed by: INTERNAL MEDICINE

## 2021-09-28 PROCEDURE — G8417 CALC BMI ABV UP PARAM F/U: HCPCS | Performed by: INTERNAL MEDICINE

## 2021-09-28 PROCEDURE — 83036 HEMOGLOBIN GLYCOSYLATED A1C: CPT | Performed by: INTERNAL MEDICINE

## 2021-09-28 RX ORDER — PRAVASTATIN SODIUM 20 MG
20 TABLET ORAL DAILY
Qty: 90 TABLET | Refills: 1 | Status: SHIPPED
Start: 2021-09-28 | End: 2022-01-12 | Stop reason: SDUPTHER

## 2021-09-28 RX ORDER — PEN NEEDLE, DIABETIC 31 GX5/16"
1 NEEDLE, DISPOSABLE MISCELLANEOUS DAILY
Qty: 100 EACH | Refills: 3 | Status: SHIPPED
Start: 2021-09-28 | End: 2022-01-12 | Stop reason: SDUPTHER

## 2021-09-28 RX ORDER — LISINOPRIL 5 MG/1
5 TABLET ORAL DAILY
Qty: 90 TABLET | Refills: 1 | Status: SHIPPED
Start: 2021-09-28 | End: 2022-01-12 | Stop reason: SDUPTHER

## 2021-09-28 RX ORDER — SITAGLIPTIN AND METFORMIN HYDROCHLORIDE 1000; 50 MG/1; MG/1
1 TABLET, FILM COATED ORAL DAILY
Qty: 90 TABLET | Refills: 1 | Status: SHIPPED
Start: 2021-09-28 | End: 2022-01-12 | Stop reason: SDUPTHER

## 2021-09-28 RX ORDER — TRIAMTERENE AND HYDROCHLOROTHIAZIDE 37.5; 25 MG/1; MG/1
1 CAPSULE ORAL EVERY MORNING
Qty: 90 CAPSULE | Refills: 1 | Status: SHIPPED
Start: 2021-09-28 | End: 2022-01-12 | Stop reason: SDUPTHER

## 2021-09-28 RX ORDER — SITAGLIPTIN AND METFORMIN HYDROCHLORIDE 1000; 50 MG/1; MG/1
1 TABLET, FILM COATED ORAL DAILY
Qty: 30 TABLET | Refills: 2 | OUTPATIENT
Start: 2021-09-28

## 2021-09-28 RX ORDER — INSULIN GLARGINE 100 [IU]/ML
10 INJECTION, SOLUTION SUBCUTANEOUS NIGHTLY
Qty: 15 ML | Refills: 0 | OUTPATIENT
Start: 2021-09-28

## 2021-09-28 RX ORDER — METOPROLOL TARTRATE 50 MG/1
50 TABLET, FILM COATED ORAL 2 TIMES DAILY
Qty: 180 TABLET | Refills: 1 | Status: SHIPPED
Start: 2021-09-28 | End: 2022-01-12 | Stop reason: SDUPTHER

## 2021-09-28 RX ORDER — LANCETS 30 GAUGE
1 EACH MISCELLANEOUS 2 TIMES DAILY
Qty: 200 EACH | Refills: 1 | Status: SHIPPED
Start: 2021-09-28 | End: 2022-01-12 | Stop reason: SDUPTHER

## 2021-09-28 RX ORDER — INSULIN GLARGINE 100 [IU]/ML
10 INJECTION, SOLUTION SUBCUTANEOUS NIGHTLY
Qty: 5 PEN | Refills: 1 | Status: SHIPPED
Start: 2021-09-28 | End: 2021-11-02 | Stop reason: SDUPTHER

## 2021-09-28 NOTE — PROGRESS NOTES
Subjective:     Chief Complaint   Patient presents with    Diabetes   Patient here follow-up on the diabetes,  Finally she is taking insulin and the medication as prescribed  She is feeling better  She had some physical therapy which did help  She had home visiting nurse home that also helped    She was seen by the ophthalmologist and they are planning cataract surgery in the next couple of months  They told her she does not have any eye damage from the diabetes    Finally she is taking all the medication as prescribed    She did not go for the CT abdomen pelvis she canceled it it was scheduled last time,  She did not see urologist Dr. Garfield Cavazos to see urologist agreed to repeat CT abdomen pelvis for the left renal lesion    Has been very noncompliant  Depression doing better        Past Medical History:   Diagnosis Date    Hypertension     Noncompliance     Renal mass     Type 2 diabetes mellitus without complication ([address]     not controlled an noncompliant         Social History     Socioeconomic History    Marital status: Single     Spouse name: Not on file    Number of children: Not on file    Years of education: Not on file    Highest education level: Not on file   Occupational History    Not on file   Tobacco Use    Smoking status: Never Smoker    Smokeless tobacco: Never Used   Substance and Sexual Activity    Alcohol use: Not Currently    Drug use: Never    Sexual activity: Not on file   Other Topics Concern    Not on file   Social History Narrative    Not on file     Social Determinants of Health     Financial Resource Strain:     Difficulty of Paying Living Expenses:    Food Insecurity: No Food Insecurity    Worried About Running Out of Food in the Last Year: Never true    Aristides of Food in the Last Year: Never true   Transportation Needs: No Transportation Needs    Lack of Transportation (Medical): No    Lack of Transportation (Non-Medical):  No   Physical Activity:     Days of Exercise per Week:     Minutes of Exercise per Session:    Stress:     Feeling of Stress :    Social Connections:     Frequency of Communication with Friends and Family:     Frequency of Social Gatherings with Friends and Family:     Attends Yarsani Services:     Active Member of Clubs or Organizations:     Attends Club or Organization Meetings:     Marital Status:    Intimate Partner Violence:     Fear of Current or Ex-Partner:     Emotionally Abused:     Physically Abused:     Sexually Abused:         Past Surgical History:   Procedure Laterality Date    HYSTERECTOMY, TOTAL ABDOMINAL          Family History   Problem Relation Age of Onset    Other Mother         brain tumor  age 32        Allergies   Allergen Reactions    Glipizide      High doses cause side effects      Medrol [Methylprednisolone]      Raises blood pressure     Metformin And Related Diarrhea and Nausea Only     Only in high doses    Pcn [Penicillins]     Lipitor [Atorvastatin Calcium] Rash    Zocor [Simvastatin] Rash        ROS  No acute distress  Cardiac: Denies any chest pain or palpitation  Denies any angina  Because of multiple risk factors she was advised to see a cardiologist she declined  Declining to see a cardiologist  Respiratory: Denies any cough or shortness of breath  GI: No abdominal pain.  Denies any nausea vomiting or diarrhea  Has declined colonoscopy  She did not do the CT abdomen pelvis as scheduled last time    : Denies any dysuria frequency or hematuria    Left renal lesion  Did not follow with Dr. Cookie Hernandez  Neuro: No headache or dizziness  Endocrine: History of diabetes not controlled in the past because of noncompliance  Now she is taking medications right A1c 9% today  Skin: normal  No recent weight gain or weight loss  Denies any change in vision    Objective:    /70   Pulse 83   Temp 95 °F (35 °C)   Wt 171 lb (77.6 kg)   SpO2 98%   BMI 30.29 kg/m²     Constitutional: Alert awake and oriented  Eyes: Pupils equal bilaterally. Extraocular muscles intact  Neck: no JVD adenopathy no bruit  Heart:  RRR, no murmurs, gallops, or rubs. Lungs:    no wheeze, rales or rhonchi  Abd: bowel sounds present, nontender, nondistended, no masses  Extrem:  No clubbing, cyanosis, or edema  Neuro: AAOx3,No Focal deficit  Psychological: Depression stable  Follows with a psychiatrist  Very noncompliant with instructions      Current Outpatient Medications   Medication Sig Dispense Refill    insulin glargine (LANTUS SOLOSTAR) 100 UNIT/ML injection pen Inject 10 Units into the skin nightly 5 pen 1    Insulin Pen Needle (PEN NEEDLES 31GX5/16\") 31G X 8 MM MISC 1 each by Does not apply route daily 100 each 3    Lancets MISC 1 each by Does not apply route 2 times daily 200 each 1    lisinopril (PRINIVIL;ZESTRIL) 5 MG tablet Take 1 tablet by mouth daily 90 tablet 1    metoprolol tartrate (LOPRESSOR) 50 MG tablet Take 1 tablet by mouth 2 times daily 180 tablet 1    pravastatin (PRAVACHOL) 20 MG tablet Take 1 tablet by mouth daily 90 tablet 1    sitaGLIPtan-metFORMIN (JANUMET)  MG per tablet Take 1 tablet by mouth daily 90 tablet 1    triamterene-hydroCHLOROthiazide (DYAZIDE) 37.5-25 MG per capsule Take 1 capsule by mouth every morning 90 capsule 1    blood glucose monitor strips Test 2 times a day & as needed for symptoms of irregular blood glucose. Dispense sufficient amount for indicated testing frequency plus additional to accommodate PRN testing needs.  200 strip 1    Continuous Blood Gluc  (FREESTYLE TOBY 14 DAY READER) ANDREA As directed 1 Device 0    Continuous Blood Gluc Sensor (FREESTYLE TOBY 14 DAY SENSOR) MISC Apply to arm every 14 days as directed 6 each 0    glucose monitoring kit (FREESTYLE) monitoring kit 1 kit by Does not apply route daily 1 kit 0    sertraline (ZOLOFT) 100 MG tablet Take 100 mg by mouth daily      buPROPion HCl (WELLBUTRIN XL PO) Take 100 mg by mouth 2 times daily  ALPRAZolam (XANAX) 0.5 MG tablet Take 0.5 mg by mouth nightly as needed for Sleep. No current facility-administered medications for this visit.         Last 3 BMP  Lab Results   Component Value Date/Time     07/27/2021 03:15 PM     07/21/2021 02:54 PM     06/24/2021 01:06 PM    K 4.8 07/27/2021 03:15 PM    K 3.9 07/21/2021 02:54 PM    K 4.4 06/24/2021 01:06 PM    K 4.6 02/09/2021 03:05 PM    K 4.4 11/11/2019 12:00 AM    K 4.6 02/27/2017 04:21 PM    CL 95 (L) 07/27/2021 03:15 PM    CL 94 (L) 07/21/2021 02:54 PM    CL 96 (L) 06/24/2021 01:06 PM    CO2 25 07/27/2021 03:15 PM    CO2 28 07/21/2021 02:54 PM    CO2 25 06/24/2021 01:06 PM    BUN 19 07/27/2021 03:15 PM    BUN 14 07/21/2021 02:54 PM    BUN 24 (H) 06/24/2021 01:06 PM    CREATININE 0.5 07/27/2021 03:15 PM    CREATININE 0.5 07/21/2021 02:54 PM    CREATININE 0.6 06/24/2021 01:06 PM    CREATININE 0.7 11/11/2019 12:00 AM    GLUCOSE 219 07/27/2021 06:33 PM    GLUCOSE 353 (H) 07/27/2021 03:15 PM    GLUCOSE 369 07/27/2021 02:26 PM    CALCIUM 9.5 07/27/2021 03:15 PM    CALCIUM 9.3 07/21/2021 02:54 PM    CALCIUM 9.8 06/24/2021 01:06 PM       Last 3 CMP:    Lab Results   Component Value Date/Time     07/27/2021 03:15 PM     07/21/2021 02:54 PM     06/24/2021 01:06 PM    K 4.8 07/27/2021 03:15 PM    K 3.9 07/21/2021 02:54 PM    K 4.4 06/24/2021 01:06 PM    K 4.6 02/09/2021 03:05 PM    K 4.4 11/11/2019 12:00 AM    K 4.6 02/27/2017 04:21 PM    CL 95 (L) 07/27/2021 03:15 PM    CL 94 (L) 07/21/2021 02:54 PM    CL 96 (L) 06/24/2021 01:06 PM    CO2 25 07/27/2021 03:15 PM    CO2 28 07/21/2021 02:54 PM    CO2 25 06/24/2021 01:06 PM    BUN 19 07/27/2021 03:15 PM    BUN 14 07/21/2021 02:54 PM    BUN 24 (H) 06/24/2021 01:06 PM    CREATININE 0.5 07/27/2021 03:15 PM    CREATININE 0.5 07/21/2021 02:54 PM    CREATININE 0.6 06/24/2021 01:06 PM    CREATININE 0.7 11/11/2019 12:00 AM    GLUCOSE 219 07/27/2021 06:33 PM    GLUCOSE 353 (H) loss  -     CT ABDOMEN PELVIS W IV CONTRAST Additional Contrast? Oral; Future  -     CBC WITH AUTO DIFFERENTIAL; Future  -     COMPREHENSIVE METABOLIC PANEL; Future    Renal lesion  -     CT ABDOMEN PELVIS W IV CONTRAST Additional Contrast? Oral; Future  -     CBC WITH AUTO DIFFERENTIAL; Future  -     COMPREHENSIVE METABOLIC PANEL; Future    Gait abnormality  -     [facility], [facility]    Hyperlipidemia, unspecified hyperlipidemia type    Essential hypertension    Other orders  -     insulin glargine (LANTUS SOLOSTAR) 100 UNIT/ML injection pen; Inject 10 Units into the skin nightly  -     Insulin Pen Needle (PEN NEEDLES 31GX5/16\") 31G X 8 MM MISC; 1 each by Does not apply route daily  -     Lancets MISC; 1 each by Does not apply route 2 times daily  -     lisinopril (PRINIVIL;ZESTRIL) 5 MG tablet; Take 1 tablet by mouth daily  -     metoprolol tartrate (LOPRESSOR) 50 MG tablet; Take 1 tablet by mouth 2 times daily  -     pravastatin (PRAVACHOL) 20 MG tablet; Take 1 tablet by mouth daily  -     sitaGLIPtan-metFORMIN (JANUMET)  MG per tablet; Take 1 tablet by mouth daily  -     triamterene-hydroCHLOROthiazide (DYAZIDE) 37.5-25 MG per capsule;  Take 1 capsule by mouth every morning       Patient Active Problem List   Diagnosis    Essential hypertension    Hyperlipidemia    Renal lesion    Uncontrolled type 2 diabetes mellitus with hyperglycemia ([facility] [address]       Plan: Uncontrolled diabetes A1c 9% now finally she is doing the right thing taking the medication as prescribed increase Lantus to 14 units at night    Weight loss slight improvement  Left renal lesion repeat CT abdomen pelvis with IV and oral contrast, do not take January 2 days after day of the CAT scan    Has problem with walking needs more physical therapy prescription given    Hyperlipidemia continue pravastatin 20 mg      Hypertension good control continue Zestril 5 mg and Dyazide      Follow with the ophthalmologist and have the surgery for the cataracts so she can see better    Discussed with her Sister Sarah Caro    She got Marlene Babe system she was taught how to use it spent 30 minutes teaching her about the Marlene Babe system and how to use it    Return in about 3 months (around 12/28/2021).        Abdifatah Hobson MD  12:59 PM  9/28/2021     [place]

## 2021-09-30 NOTE — PROGRESS NOTES
Diabetes Self-Management Education Record    Participant Name: Niya Souza  Referring Provider: Mary Ellen Tapia MD  Assessment/Evaluation Ratings:  1=Needs Instruction   4=Demonstrates Understanding/Competency  2=Needs Review   NC=Not Covered    3=Comprehends Key Points  N/A=Not Applicable  Topics/Learning Objectives Pre-session Assess Date:  Instructor initials/date  7/7/21 PC Instr. Date  7/7/21 PC  Instructor initials/date Follow-up Post- session Eval Comments   Diabetes disease process & Treatment process:   -Define type of diabetes in simple terms.  - Describe the ABCs of  diabetes management  -Identify own type of diabetes  -Identify lifestyle changes/treatment options  -other:  2 [] All     []  []  []  []  []   7/7/21 PC  Hx of type 2 DM since around 2018    Dr did teach her the meter but never tested    Sister with her and will over see her with her testing and Lantus Solostar pen for now   Developing strategies for Healthy coping/psychosocial issues:    -Describe feelings about living with diabetes  -Identify coping strategies and sources of stress  -Identify support needed & support network available  -Complete PAID-5 Diabetes questionnaire 2 [] All     []  []  []    []     Niya Souza completed a Diabetes Self- Management Education Assessment on 7/7/21. Part of our assessment is having the patient complete the PAID (Problem Areas in Diabetes Scale)-5 survey. This tool  measures diabetes-related emotional distress a patient may be feeling. Prem Mckennant Sy scored _8_   A total score of >8 indicates possible diabetes related emotional distress, which warrants further assessment and a referral to mental health professional for psychological support and treatment.          Prevention, detection & treatment of Chronic complications:    -Identify the prevention, detection and treatment for complications including immunizations, preventive eye, foot, dental and renal exams as indicated per the participant's duration of diabetes and health status.  -Define the natural course of diabetes and the relationship of blood glucose levels to long term complications of diabetes. 2 [] All     []            []   7/7/21 PC  Stated neuropathy in hands. Hands are somewhat deformed and she kept dropping pen, alcohol wipes   Prevention, detection & treatment of acute complications:    -State the causes,signs & symptoms of hyper & hypoglycemia, and prevention & treatment strategies.   -Describe sick day guidelines  DKA /indications for ketone testing &  when to call physician  2 [] All     []      [x]    3 7/7/21 PC    Has has hyper and hypoglycemia               -Identify severe weather/situation crisis  & diabetes supplies management  []      Using medications safely:   -State effects of diabetes medicines on blood glucose levels;  -List diabetes medication taken, action & side effects 1 [x] All     []  []  2 7/7/21 PC  Instructed on Lantus Solostar insulin pen. .  Written/pictorial instructions used for session. Reviewed timing of insulin and importance of taking at the same time daily with rational.  Demo given and pt returned demo x 2. Needed verbal assist and shown how to hold the pen a few times. Bent the needle on the injection pillow. Stated she has VN 3 days a week now. Sister agrees to go to her home 6p-7p to help with insulin. Sister has done the insulin pen in the past and does understand steps and content reviewed. Pt overwhelmed even with guide. Not consistently dialing to 10 units. Site selection and rotation reviewed. Afraid needle will break off in her. Discussed. Needs to be overseen at this point. 2 hours spent with her    Janumet reviewed. To take with first meal.  Action reviewed. Not taking right or consistent at home.    Insulin/Injectables/glucagon  -Name appropriate injection sites; proper storage; supplies needed;  1   []  2     Demonstrate proper technique  []      Monitoring blood glucose, interpreting and using results:   -Identify the purpose of testing   -Identify recommended & personal blood glucose targets & HgbA1C target levels  -State the Importance of logging blood glucose levels for pattern recognition;   -State benefits of reading/using pt generated health data  -Verbalize safe lancet disposal 1 [x] All     []  []    []  []  []  2 21 PC  She did not bring her strips with her and has a few lancets. I called [facility] pharmacy and they said they had refills for strips and lancets. Pt stated this is her 2nd meter so now unsure what refills for what meter they have. Demo given with my meter. Did show lancet device and used her written/pictorial guide and asked to use at home. Unsure if she will be able to do this. Having a hard time trying to get blood out. I used my  strips and her BG was 487 mg/dl. Dr. Zac Campbell notified via VM. -Demonstrate proper testing technique  []      Incorporating physical activity into lifestyle:   -State effect of exercise on blood glucose levels;   -State benefits of regular exercise;   -Define safety considerations/food choices if needed.  -Describe contraindications/maintenance of activity. 1 [] All     []  []    []  []      Incorporating nutritional management into lifestyle:   -Describe effect of type, amount & timing of food on blood glucose  -Describe methods for preparing and planning healthy meals  -Correctly read food labels  -Name 3 foods high in Carbohydrate 2 [] All       []    []    []  []   21 PC    Encouraged 3 meals and hs snack. Consistent times if possible. Skips lunch. Inconsistent sleep schedule. Admits to not following dr instructions.   -Plan a carbohydrate-controlled meal based on individualized meal plan  -Demonstrate CHO counting/portion control   []  []      Developing strategies for problem solving to promote health/change behavior. -Identify 7 self-care behaviors; Personal health risk factors;  Benefits, challenges & strategies for behavioral change and set an individualized goal selection. 1       []   7/7/21 PC  []Nutrition  [x]Monitoring  []Exercise  []Medication  []Other     Identified Barriers to learning/adherence to self management plan:    None  []  other    Instruction Method:  Lecture/Discussion, Handouts and Return demonstration     Supporting Education Materials/Equipment Provided: Glucose Meter, Insulin Starter Kit and Survival Packet    []Lao materials       [] services     []Other:      Encounter Type Date Attended Start Time End Time Comments No Show Dates   Assessment          Session 1         Session 2        1:1 DSMES  7/7/21 PC 1100 1300  in person    In person Follow-up         Gestational Diabetes         Individual MNT        Meter Instrx        Insulin Instrx           Additional Comments: [] Pt seen individually due to Covid-19 Safety precautions and no group session available. Date:   9/30/21 Follow-up goal attainment based on patients initial DSMES goal   unmet Dr Notified by [x] EMR []Fax        []Post class Hgb A1C  []Medication compliance   []Plate method/meal plan compliance   []Able to state the number of Carbohydrate servings eaten at B,L,D   [x]Testing blood glucose as prescribed by PCP  Just had a Darius sensor put on at the office and uncomfortable with reader and has never tested.      []Exercise Routine   []Other:   []Other:     []Patient lost to follow-up  Dr Notified by []EMR []Fax     Personal Support Plan:      [x] Keep all scheduled doctor appointments   [] Make and keep appointments with specialists (foot, eye, dentist) as recommended   [] Consult my pharmacist about all new medications or to ask any medication questions   [] Get tested for sleep apnea   [] Seek help for:   [] Make an appointment with:   [] Attend smoking cessation classes or call 1-800-QUIT-NOW  [] Attend Diabetes Support Group   [] Use diabetes magazines, books, or credible web-sites like the ADA for more information  [] Increase exercise at home or join an exercise program:   [] Other:

## 2021-10-25 ENCOUNTER — TELEPHONE (OUTPATIENT)
Dept: DIABETES SERVICES | Age: 71
End: 2021-10-25

## 2021-11-01 NOTE — TELEPHONE ENCOUNTER
[facility] pharmacist called asking if Dr. Suad Conner would send order for Lantus Solostar, stating patient had a malfunction with her Lantus Solostar injection pens, filed a complaint with the company and the RX was sent to [facility]. Hugo Kyle stated he will dispense, but needs new RX sent.      Last seen 9/28/2021  Next appt 12/29/2021  [facility]

## 2021-11-02 ENCOUNTER — TELEPHONE (OUTPATIENT)
Dept: FAMILY MEDICINE CLINIC | Age: 71
End: 2021-11-02

## 2021-11-02 RX ORDER — INSULIN GLARGINE 100 [IU]/ML
10 INJECTION, SOLUTION SUBCUTANEOUS NIGHTLY
Qty: 5 PEN | Refills: 1 | Status: SHIPPED
Start: 2021-11-02 | End: 2021-12-29 | Stop reason: SDUPTHER

## 2021-11-02 NOTE — TELEPHONE ENCOUNTER
Left message for patient.  Test must be rescheduled because we have not gotten approval from the insurance company    Left message for Angle Sutherland at [facility] also

## 2021-11-02 NOTE — TELEPHONE ENCOUNTER
Matteo Holland called to inform patient's CT which is scheduled for 11/3/21 at 2:00 pm requires an authorization from her insurance, [employer].     Mylene Musa, [phone]

## 2021-11-02 NOTE — TELEPHONE ENCOUNTER
Left a message for the patient does she do the CT abdomen and pelvis advised to call back and let me know

## 2021-11-03 NOTE — TELEPHONE ENCOUNTER
Janelle/[facility] called asking for labs to be faxed prior to CT today. Informed per MA, as noted below, auth was not rec'd and patient was left a  msg that she will need to RS.

## 2021-11-08 ENCOUNTER — ANESTHESIA EVENT (OUTPATIENT)
Dept: OPERATING ROOM | Age: 71
End: 2021-11-08
Payer: MEDICARE

## 2021-11-08 NOTE — H&P
History and Physical    Patient's Name/Date of Birth: Andi Schreiber / 1950 (66 y.o.)    Date: November 8, 2021     Chief Complaint: Decreased vision of the right eye    HPI: Mature right cataract with decreased vision. Risks and complications as well as options and benefits were discussed with the patient and she has elected to proceed with right cataract extraction with intra ocular lens implant. Past Medical History:   Diagnosis Date    Hyperlipidemia     Hypertension     Noncompliance     Renal mass     Left Kidney nodule.  Thyroid disease     Type 2 diabetes mellitus without complication ([address]     not controlled an noncompliant        Past Surgical History:   Procedure Laterality Date    BACK SURGERY  2004    C4-5     BREAST SURGERY  1962    HYSTERECTOMY, TOTAL ABDOMINAL      TONSILLECTOMY      childhood       Prior to Admission medications    Medication Sig Start Date End Date Taking?  Authorizing Provider   insulin glargine (LANTUS SOLOSTAR) 100 UNIT/ML injection pen Inject 10 Units into the skin nightly  Patient taking differently: Inject 14 Units into the skin nightly 1/2 dose night before surgery 11/8/21 11/2/21  Yes Irwin Hogan MD   lisinopril (PRINIVIL;ZESTRIL) 5 MG tablet Take 1 tablet by mouth daily 9/28/21  Yes Irwin Hogan MD   metoprolol tartrate (LOPRESSOR) 50 MG tablet Take 1 tablet by mouth 2 times daily 9/28/21  Yes Irwin Hogan MD   pravastatin (PRAVACHOL) 20 MG tablet Take 1 tablet by mouth daily 9/28/21  Yes Irwin Hogan MD   sitaGLIPtan-metFORMIN (JANUMET)  MG per tablet Take 1 tablet by mouth daily 9/28/21  Yes Irwin Hogan MD   triamterene-hydroCHLOROthiazide (DYAZIDE) 37.5-25 MG per capsule Take 1 capsule by mouth every morning 9/28/21  Yes Irwin Hogan MD   sertraline (ZOLOFT) 100 MG tablet Take 100 mg by mouth daily   Yes Historical Provider, MD   buPROPion HCl (WELLBUTRIN XL PO) Take 100 mg by mouth daily    Yes Historical Provider, MD   ALPEDZokatherine Gleason Adjutant) 0.5 MG tablet Take 0.5 mg by mouth nightly as needed for Sleep. Yes Historical Provider, MD   Insulin Pen Needle (PEN NEEDLES 31GX5/16\") 31G X 8 MM MISC 1 each by Does not apply route daily 21   Nevin Earl MD   Lancets MISC 1 each by Does not apply route 2 times daily 21   Nevin Earl MD   blood glucose monitor strips Test 2 times a day & as needed for symptoms of irregular blood glucose. Dispense sufficient amount for indicated testing frequency plus additional to accommodate PRN testing needs.  21   Nevin Earl MD   Continuous Blood Gluc  (FREESTYLE TOBY 14 DAY READER) ANDREA As directed 8/10/21   Nevin Earl MD   Continuous Blood Gluc Sensor (FREESTYLE TOBY 14 DAY SENSOR) MISC Apply to arm every 14 days as directed 8/10/21   Nevin Earl MD   glucose monitoring kit (FREESTYLE) monitoring kit 1 kit by Does not apply route daily 3/4/21   Nevin Earl MD       Glipizide, Medrol [methylprednisolone], Metformin and related, Pcn [penicillins], Lipitor [atorvastatin calcium], and Zocor [simvastatin]    Family History   Problem Relation Age of Onset    Other Mother         brain tumor  age 32       Social History     Socioeconomic History    Marital status: Single     Spouse name: Not on file    Number of children: Not on file    Years of education: Not on file    Highest education level: Not on file   Occupational History    Not on file   Tobacco Use    Smoking status: Never Smoker    Smokeless tobacco: Never Used   Substance and Sexual Activity    Alcohol use: Not Currently    Drug use: Never    Sexual activity: Not on file   Other Topics Concern    Not on file   Social History Narrative    Not on file     Social Determinants of Health     Financial Resource Strain:     Difficulty of Paying Living Expenses: Not on file   Food Insecurity: No Food Insecurity    Worried About Running Out of Food in the Last Year: Never true    [address] in the Last Year: Never true   Transportation Needs: No Transportation Needs    Lack of Transportation (Medical): No    Lack of Transportation (Non-Medical): No   Physical Activity:     Days of Exercise per Week: Not on file    Minutes of Exercise per Session: Not on file   Stress:     Feeling of Stress : Not on file   Social Connections:     Frequency of Communication with Friends and Family: Not on file    Frequency of Social Gatherings with Friends and Family: Not on file    Attends [employer] Services: Not on file    Active Member of [address] [place] [address] or Organizations: Not on file    Attends Club or Organization Meetings: Not on file    Marital Status: Not on file   Intimate Partner Violence:     Fear of Current or Ex-Partner: Not on file    Emotionally Abused: Not on file    Physically Abused: Not on file    Sexually Abused: Not on file   Housing Stability:     Unable to Pay for Housing in the Last Year: Not on file    Number of [place] in the Last Year: Not on file    Unstable Housing in the Last Year: Not on file       Review of Systems:   CONSTITUTIONAL: oriented to person, place and time   EYES:  Mature right cataract with decreased vision effecting reading, driving and all routine home activities.   Visual acuity is 20/300  HEENT:  negative  RESPIRATORY:  negative  CARDIOVASCULAR:  negative  GASTROINTESTINAL:  negative  GENITOURINARY:  negative  INTEGUMENT/BREAST:  negative  HEMATOLOGIC/LYMPHATIC:  negative  ALLERGIC/IMMUNOLOGIC:  negative  ENDOCRINE:  negative  MUSCULOSKELETAL:  negative  NEUROLOGICAL:  negative  BEHAVIOR/PSYCH:  negative    Physical Exam:  Vitals:    11/03/21 1426   Weight: 170 lb (77.1 kg)   Height: 5' 3\" (1.6 m)       CONSTITUTIONAL:  awake, alert, cooperative, no apparent distress, and appears stated age  EYES:  Lids and lashes normal, pupils equal, round and reactive to light, extra ocular muscles intact, sclera clear,

## 2021-11-08 NOTE — ANESTHESIA PRE PROCEDURE
Department of Anesthesiology  Preprocedure Note       Name:  Peter Oliva   Age:  66 y.o.  :  1950                                          MRN:  [id number]         Date:  2021      Surgeon: Maite Sharpe):  Michelle Snyder MD    Procedure: Procedure(s):  RIGHT EYE CATARACT EXTRACTION AND IOL    Medications prior to admission:   Prior to Admission medications    Medication Sig Start Date End Date Taking? Authorizing Provider   insulin glargine (LANTUS SOLOSTAR) 100 UNIT/ML injection pen Inject 10 Units into the skin nightly  Patient taking differently: Inject 14 Units into the skin nightly 1/2 dose night before surgery 21  Yes Shon Hare MD   lisinopril (PRINIVIL;ZESTRIL) 5 MG tablet Take 1 tablet by mouth daily 21  Yes Shon Hare MD   metoprolol tartrate (LOPRESSOR) 50 MG tablet Take 1 tablet by mouth 2 times daily 21  Yes Shon Hare MD   pravastatin (PRAVACHOL) 20 MG tablet Take 1 tablet by mouth daily 21  Yes Shon Hare MD   sitaGLIPtan-metFORMIN (JANUMET)  MG per tablet Take 1 tablet by mouth daily 21  Yes Shon Hare MD   triamterene-hydroCHLOROthiazide (DYAZIDE) 37.5-25 MG per capsule Take 1 capsule by mouth every morning 21  Yes Shon Hare MD   sertraline (ZOLOFT) 100 MG tablet Take 100 mg by mouth daily   Yes Historical Provider, MD   buPROPion HCl (WELLBUTRIN XL PO) Take 100 mg by mouth daily    Yes Historical Provider, MD   ALPRAZolam Darci Rising) 0.5 MG tablet Take 0.5 mg by mouth nightly as needed for Sleep. Yes Historical Provider, MD   Insulin Pen Needle (PEN NEEDLES 31GX5/16\") 31G X 8 MM MISC 1 each by Does not apply route daily 21   Shon Hare MD   Lancets MISC 1 each by Does not apply route 2 times daily 21   Shon Hare MD   blood glucose monitor strips Test 2 times a day & as needed for symptoms of irregular blood glucose.  Dispense sufficient amount for indicated testing frequency plus additional to accommodate PRN testing needs. 8/26/21   Kinjal Carter MD   Continuous Blood Gluc  (FREESTYLE TOBY 14 DAY READER) ANDREA As directed 8/10/21   Kinjal Carter MD   Continuous Blood Gluc Sensor (FREESTYLE TOBY 14 DAY SENSOR) MISC Apply to arm every 14 days as directed 8/10/21   Kinjal Carter MD   glucose monitoring kit (FREESTYLE) monitoring kit 1 kit by Does not apply route daily 3/4/21   Kinjal Carter MD       Current medications:    No current facility-administered medications for this encounter. Current Outpatient Medications   Medication Sig Dispense Refill    insulin glargine (LANTUS SOLOSTAR) 100 UNIT/ML injection pen Inject 10 Units into the skin nightly (Patient taking differently: Inject 14 Units into the skin nightly 1/2 dose night before surgery 11/8/21) 5 pen 1    lisinopril (PRINIVIL;ZESTRIL) 5 MG tablet Take 1 tablet by mouth daily 90 tablet 1    metoprolol tartrate (LOPRESSOR) 50 MG tablet Take 1 tablet by mouth 2 times daily 180 tablet 1    pravastatin (PRAVACHOL) 20 MG tablet Take 1 tablet by mouth daily 90 tablet 1    sitaGLIPtan-metFORMIN (JANUMET)  MG per tablet Take 1 tablet by mouth daily 90 tablet 1    triamterene-hydroCHLOROthiazide (DYAZIDE) 37.5-25 MG per capsule Take 1 capsule by mouth every morning 90 capsule 1    sertraline (ZOLOFT) 100 MG tablet Take 100 mg by mouth daily      buPROPion HCl (WELLBUTRIN XL PO) Take 100 mg by mouth daily       ALPRAZolam (XANAX) 0.5 MG tablet Take 0.5 mg by mouth nightly as needed for Sleep.  Insulin Pen Needle (PEN NEEDLES 31GX5/16\") 31G X 8 MM MISC 1 each by Does not apply route daily 100 each 3    Lancets MISC 1 each by Does not apply route 2 times daily 200 each 1    blood glucose monitor strips Test 2 times a day & as needed for symptoms of irregular blood glucose.  Dispense sufficient amount for indicated testing frequency plus additional to accommodate PRN testing needs. 200 strip 1    Continuous Blood Gluc  (FREESTYLE TOBY 14 DAY READER) ANDREA As directed 1 Device 0    Continuous Blood Gluc Sensor (FREESTYLE TOBY 14 DAY SENSOR) MISC Apply to arm every 14 days as directed 6 each 0    glucose monitoring kit (FREESTYLE) monitoring kit 1 kit by Does not apply route daily 1 kit 0       Allergies: Allergies   Allergen Reactions    Glipizide      High doses cause side effects      Medrol [Methylprednisolone]      Raises blood pressure     Metformin And Related Diarrhea and Nausea Only     Only in high doses    Pcn [Penicillins]     Lipitor [Atorvastatin Calcium] Rash    Zocor [Simvastatin] Rash       Problem List:    Patient Active Problem List   Diagnosis Code    Essential hypertension I10    Hyperlipidemia E78.5    Renal lesion N28.9    Uncontrolled type 2 diabetes mellitus with hyperglycemia ([address] E11.65       Past Medical History:        Diagnosis Date    Hyperlipidemia     Hypertension     Noncompliance     Renal mass     Left Kidney nodule.      Thyroid disease     Type 2 diabetes mellitus without complication ([address]     not controlled an noncompliant        Past Surgical History:        Procedure Laterality Date    BACK SURGERY  2004    C4-5     BREAST SURGERY  1962    HYSTERECTOMY, TOTAL ABDOMINAL      TONSILLECTOMY      childhood       Social History:    Social History     Tobacco Use    Smoking status: Never Smoker    Smokeless tobacco: Never Used   Substance Use Topics    Alcohol use: Not Currently                                Counseling given: Not Answered      Vital Signs (Current):   Vitals:    11/03/21 1426   Weight: 170 lb (77.1 kg)   Height: 5' 3\" (1.6 m)                                              BP Readings from Last 3 Encounters:   09/28/21 122/70   07/27/21 122/72   07/21/21 130/85       NPO Status:                                                                                 BMI:   Wt Readings from Last 3 Encounters:   09/28/21 171 lb (77.6 kg)   07/27/21 168 lb (76.2 kg)   07/21/21 160 lb (72.6 kg)     Body mass index is 30.11 kg/m². CBC:   Lab Results   Component Value Date    WBC 11.1 09/28/2021    RBC 4.90 09/28/2021    HGB 15.3 09/28/2021    HCT 46.0 09/28/2021    MCV 93.9 09/28/2021    RDW 13.3 09/28/2021     09/28/2021       CMP:   Lab Results   Component Value Date     09/28/2021    K 5.0 09/28/2021    K 4.8 07/27/2021    CL 98 09/28/2021    CO2 22 09/28/2021    BUN 35 09/28/2021    CREATININE 0.6 09/28/2021    CREATININE 0.7 11/11/2019    GFRAA >60 09/28/2021    LABGLOM >60 09/28/2021    GLUCOSE 228 09/28/2021    PROT 8.3 09/28/2021    CALCIUM 10.2 09/28/2021    BILITOT 0.5 09/28/2021    ALKPHOS 77 09/28/2021    AST 38 09/28/2021    ALT 37 09/28/2021       POC Tests: No results for input(s): POCGLU, POCNA, POCK, POCCL, POCBUN, POCHEMO, POCHCT in the last 72 hours. Coags: No results found for: PROTIME, INR, APTT    HCG (If Applicable): No results found for: PREGTESTUR, PREGSERUM, HCG, HCGQUANT     ABGs: No results found for: PHART, PO2ART, TQR5VRL, WMR0ZVD, BEART, D3XKYAPG     Type & Screen (If Applicable):  No results found for: LABABO, LABRH    Drug/Infectious Status (If Applicable):  No results found for: HIV, HEPCAB    COVID-19 Screening (If Applicable): No results found for: COVID19        Anesthesia Evaluation  Patient summary reviewed no history of anesthetic complications:   Airway: Mallampati: II  TM distance: >3 FB   Neck ROM: full  Mouth opening: > = 3 FB Dental:          Pulmonary:Negative Pulmonary ROS breath sounds clear to auscultation                             Cardiovascular:    (+) hypertension:, hyperlipidemia        Rhythm: regular  Rate: normal                    Neuro/Psych:   Negative Neuro/Psych ROS              GI/Hepatic/Renal:   (+) renal disease:,          ROS comment: Kidney mass. Endo/Other:    (+) DiabetesType II DM, using insulin, . Abdominal:             Vascular: negative vascular ROS. Other Findings:           Anesthesia Plan      MAC     ASA 3       Induction: intravenous. History, data and pertinent studies from chart review. Above represents information available via the shared medical record including previous anesthetic, medication and allergy history. Confirmation of above and final disposition per DOS anesthesiologist.      Shania Davila MD   11/8/2021      ------DOS anesthesiologist addendum-------  Patient seen and evaluated. Plan for MAC anesthetic discussed with patient. All patient's questions were answered to her satisfaction. Patient consents to and agrees to [facility] anesthetic.   Riccardo Lane MD  11/9/21

## 2021-11-09 ENCOUNTER — HOSPITAL ENCOUNTER (OUTPATIENT)
Age: 71
Setting detail: OUTPATIENT SURGERY
Discharge: HOME | End: 2021-11-09
Attending: OPHTHALMOLOGY | Admitting: OPHTHALMOLOGY
Payer: MEDICARE

## 2021-11-09 ENCOUNTER — ANESTHESIA (OUTPATIENT)
Dept: OPERATING ROOM | Age: 71
End: 2021-11-09
Payer: MEDICARE

## 2021-11-09 VITALS
DIASTOLIC BLOOD PRESSURE: 70 MMHG | RESPIRATION RATE: 18 BRPM | OXYGEN SATURATION: 100 % | SYSTOLIC BLOOD PRESSURE: 119 MMHG

## 2021-11-09 VITALS
BODY MASS INDEX: 30.3 KG/M2 | HEIGHT: 63 IN | SYSTOLIC BLOOD PRESSURE: 109 MMHG | OXYGEN SATURATION: 95 % | WEIGHT: 171 LBS | TEMPERATURE: 97.8 F | DIASTOLIC BLOOD PRESSURE: 54 MMHG | HEART RATE: 65 BPM | RESPIRATION RATE: 16 BRPM

## 2021-11-09 PROBLEM — H26.9 RIGHT CATARACT: Status: ACTIVE | Noted: 2021-11-09

## 2021-11-09 LAB — METER GLUCOSE: 196 MG/DL (ref 74–99)

## 2021-11-09 PROCEDURE — 6370000000 HC RX 637 (ALT 250 FOR IP): Performed by: OPHTHALMOLOGY

## 2021-11-09 PROCEDURE — 7100000010 HC PHASE II RECOVERY - FIRST 15 MIN: Performed by: OPHTHALMOLOGY

## 2021-11-09 PROCEDURE — 2500000003 HC RX 250 WO HCPCS: Performed by: OPHTHALMOLOGY

## 2021-11-09 PROCEDURE — 6360000002 HC RX W HCPCS: Performed by: NURSE ANESTHETIST, CERTIFIED REGISTERED

## 2021-11-09 PROCEDURE — 2709999900 HC NON-CHARGEABLE SUPPLY: Performed by: OPHTHALMOLOGY

## 2021-11-09 PROCEDURE — 82962 GLUCOSE BLOOD TEST: CPT

## 2021-11-09 PROCEDURE — 3700000000 HC ANESTHESIA ATTENDED CARE: Performed by: OPHTHALMOLOGY

## 2021-11-09 PROCEDURE — 3600000003 HC SURGERY LEVEL 3 BASE: Performed by: OPHTHALMOLOGY

## 2021-11-09 PROCEDURE — 2500000003 HC RX 250 WO HCPCS: Performed by: NURSE ANESTHETIST, CERTIFIED REGISTERED

## 2021-11-09 PROCEDURE — 2580000003 HC RX 258: Performed by: ANESTHESIOLOGY

## 2021-11-09 PROCEDURE — 82962 GLUCOSE BLOOD TEST: CPT | Performed by: OPHTHALMOLOGY

## 2021-11-09 PROCEDURE — 7100000011 HC PHASE II RECOVERY - ADDTL 15 MIN: Performed by: OPHTHALMOLOGY

## 2021-11-09 PROCEDURE — V2632 POST CHMBR INTRAOCULAR LENS: HCPCS | Performed by: OPHTHALMOLOGY

## 2021-11-09 DEVICE — LENS INTOCU +24.5 DIOPT A CONSTANT 118.8 L13MM DIA6MM 0DEG: Type: IMPLANTABLE DEVICE | Site: EYE | Status: FUNCTIONAL

## 2021-11-09 RX ORDER — TETRACAINE HYDROCHLORIDE 5 MG/ML
1 SOLUTION OPHTHALMIC ONCE
Status: COMPLETED | OUTPATIENT
Start: 2021-11-09 | End: 2021-11-09

## 2021-11-09 RX ORDER — SODIUM CHLORIDE, SODIUM LACTATE, POTASSIUM CHLORIDE, CALCIUM CHLORIDE 600; 310; 30; 20 MG/100ML; MG/100ML; MG/100ML; MG/100ML
INJECTION, SOLUTION INTRAVENOUS CONTINUOUS
Status: DISCONTINUED | OUTPATIENT
Start: 2021-11-09 | End: 2021-11-09 | Stop reason: HOSPADM

## 2021-11-09 RX ORDER — MIDAZOLAM HYDROCHLORIDE 1 MG/ML
INJECTION INTRAMUSCULAR; INTRAVENOUS PRN
Status: DISCONTINUED | OUTPATIENT
Start: 2021-11-09 | End: 2021-11-09 | Stop reason: SDUPTHER

## 2021-11-09 RX ORDER — FLURBIPROFEN SODIUM 0.3 MG/ML
1 SOLUTION/ DROPS OPHTHALMIC
Status: COMPLETED | OUTPATIENT
Start: 2021-11-09 | End: 2021-11-09

## 2021-11-09 RX ORDER — TETRACAINE HYDROCHLORIDE 5 MG/ML
SOLUTION OPHTHALMIC PRN
Status: DISCONTINUED | OUTPATIENT
Start: 2021-11-09 | End: 2021-11-09 | Stop reason: ALTCHOICE

## 2021-11-09 RX ORDER — PHENYLEPHRINE HYDROCHLORIDE 100 MG/ML
1 SOLUTION/ DROPS OPHTHALMIC PRN
Status: DISCONTINUED | OUTPATIENT
Start: 2021-11-09 | End: 2021-11-09 | Stop reason: HOSPADM

## 2021-11-09 RX ORDER — CYCLOPENTOLATE HYDROCHLORIDE 10 MG/ML
1 SOLUTION/ DROPS OPHTHALMIC
Status: COMPLETED | OUTPATIENT
Start: 2021-11-09 | End: 2021-11-09

## 2021-11-09 RX ORDER — BALANCED SALT SOLUTION 6.4; .75; .48; .3; 3.9; 1.7 MG/ML; MG/ML; MG/ML; MG/ML; MG/ML; MG/ML
SOLUTION OPHTHALMIC PRN
Status: DISCONTINUED | OUTPATIENT
Start: 2021-11-09 | End: 2021-11-09 | Stop reason: ALTCHOICE

## 2021-11-09 RX ORDER — ALFENTANIL HYDROCHLORIDE 500 UG/ML
INJECTION INTRAVENOUS PRN
Status: DISCONTINUED | OUTPATIENT
Start: 2021-11-09 | End: 2021-11-09 | Stop reason: SDUPTHER

## 2021-11-09 RX ORDER — PHENYLEPHRINE HCL 2.5 %
1 DROPS OPHTHALMIC (EYE)
Status: COMPLETED | OUTPATIENT
Start: 2021-11-09 | End: 2021-11-09

## 2021-11-09 RX ADMIN — SODIUM CHLORIDE, POTASSIUM CHLORIDE, SODIUM LACTATE AND CALCIUM CHLORIDE: 600; 310; 30; 20 INJECTION, SOLUTION INTRAVENOUS at 05:59

## 2021-11-09 RX ADMIN — FLURBIPROFEN SODIUM 1 DROP: 0.3 SOLUTION/ DROPS OPHTHALMIC at 06:05

## 2021-11-09 RX ADMIN — FLURBIPROFEN SODIUM 1 DROP: 0.3 SOLUTION/ DROPS OPHTHALMIC at 06:15

## 2021-11-09 RX ADMIN — MIDAZOLAM 1 MG: 1 INJECTION INTRAMUSCULAR; INTRAVENOUS at 06:57

## 2021-11-09 RX ADMIN — CYCLOPENTOLATE HYDROCHLORIDE 1 DROP: 10 SOLUTION/ DROPS OPHTHALMIC at 06:15

## 2021-11-09 RX ADMIN — PHENYLEPHRINE HYDROCHLORIDE 1 DROP: 25 SOLUTION/ DROPS OPHTHALMIC at 06:05

## 2021-11-09 RX ADMIN — ALFENTANIL HYDROCHLORIDE 250 MCG: 500 INJECTION INTRAVENOUS at 07:01

## 2021-11-09 RX ADMIN — PHENYLEPHRINE HYDROCHLORIDE 1 DROP: 25 SOLUTION/ DROPS OPHTHALMIC at 06:10

## 2021-11-09 RX ADMIN — CYCLOPENTOLATE HYDROCHLORIDE 1 DROP: 10 SOLUTION/ DROPS OPHTHALMIC at 06:10

## 2021-11-09 RX ADMIN — PHENYLEPHRINE HYDROCHLORIDE 1 DROP: 25 SOLUTION/ DROPS OPHTHALMIC at 06:15

## 2021-11-09 RX ADMIN — ALFENTANIL HYDROCHLORIDE 250 MCG: 500 INJECTION INTRAVENOUS at 06:58

## 2021-11-09 RX ADMIN — FLURBIPROFEN SODIUM 1 DROP: 0.3 SOLUTION/ DROPS OPHTHALMIC at 06:10

## 2021-11-09 RX ADMIN — CYCLOPENTOLATE HYDROCHLORIDE 1 DROP: 10 SOLUTION/ DROPS OPHTHALMIC at 06:05

## 2021-11-09 RX ADMIN — TETRACAINE HYDROCHLORIDE 1 DROP: 25 LIQUID OPHTHALMIC at 06:25

## 2021-11-09 ASSESSMENT — PAIN - FUNCTIONAL ASSESSMENT: PAIN_FUNCTIONAL_ASSESSMENT: 0-10

## 2021-11-09 ASSESSMENT — PAIN SCALES - GENERAL
PAINLEVEL_OUTOF10: 0
PAINLEVEL_OUTOF10: 0

## 2021-11-09 NOTE — H&P
Update History & Physical    The patient's History and Physical of 11 / 8 / 2021 was reviewed with the patient and there were no significant changes. I examined the patient and there were no significant changes from the previous History and Physical.    Plan: The risk, benefits, expected outcome, and alternative to the recommended procedure have been discussed with the patient. Patient understands and wants to proceed with the procedure.     Electronically signed by Roddy Hidalgo MD on 11/9/21 at 7:15 AM EST

## 2021-11-09 NOTE — OP NOTE
PREOPERATIVE DIAGNOSIS:  Right Cataract    POSTOPERATIVE DIAGNOSIS:  Right Cataract    PROCEDURE:  Right phacoemulsification with intraocular lens implant. ANESTHESIA:  Local Mac    ESTIMATED BLOOD LOSS:  Minimal    COMPLICATIONS:  None    DESCRIPTION OF PROCEDURE:  The patient was brought into the operating room. The operative eye was marked, which was the right eye. The right eye was then prepped with a full-strength Betadine preparation. The periorbital area was copiously washed with Betadine. The lashes were washed with Betadine. Dilute Betadine was then also put in the inferior and superior fornices and left in place for approximately a minute or 2. This was then irrigated with sterile water. The face was then wiped and the preparation was repeated 1 more time. The drape was then placed over the operative eye with the sticky adhesive placed at the lid margins so that it draped the lashes out of the operative field superiorly and inferiorly, as well as isolated the meibomian glands behind the drape. This cleared the operative field of any meibomian gland secretions and eyelashes. Next, a lid speculum was positioned in the right eye. A super sharp blade was used to make a side-port incision at the 2 o'clock position. A clear corneal incision was fashioned over the 12 o;clock meridian with a 2.3mm keratome at the limbus. Healon was used to reform the anterior chamber. A continuous tear anterior capsulotomy was then performed with a bent needle cystotome. Hydrodissection was performed by irrigating with balanced salt solution through a syringe underneath the anterior capsular flap to loosen and allow free rotation of the lens nucleus. Phacoemulsification was used and the entire lens nucleus was removed. The I A unit was instilled in the eye, and the remaining cortex was removed. Healon was used to separate the anterior and posterior capsular flaps.   The PCBOO 24.5 diopter lens was then instilled into the capsular bag and dialed to 3 and 9 o'clock positions. Healon was removed from in front of and behind the lens. The lens was found to be well centered, well positioned in the bag and stable. The wound was checked and found to be airtight and watertight. The lid speculum was removed and the drape was removed. The patient was brought to the recovery room in excellent condition, given postoperative instructions, and discharge in excellent condition. Operative Note      Patient: Daya Lee  YOB: 1950  MRN: [id number]    Date of Procedure: 11/9/2021    Pre-Op Diagnosis: RIGHT CATARACT    Post-Op Diagnosis: Same       Procedure(s):  RIGHT EYE CATARACT EXTRACTION AND IOL    Surgeon(s):  Fabrizio Miller MD    Assistant:   * No surgical staff found *    Anesthesia: Monitor Anesthesia Care    Estimated Blood Loss (mL): Minimal    Complications: None    Specimens:   * No specimens in log *    Implants:  Implant Name Type Inv.  Item Serial No.  Lot No. LRB No. Used Action   LENS INTOCU +24.5 DIOPT A CONSTANT 118.8 L13MM DIA6MM 0DEG - [id number]  LENS INTOCU +24.5 DIOPT A CONSTANT 118.8 L13MM DIA6MM 0DEG [id number] [employer]  Right 1 Implanted         Drains: * No LDAs found *    Findings: Left cataract    Detailed Description of Procedure:   Left cataract extraction with intra ocular lens implant    Electronically signed by Lucy Liang MD on 11/9/2021 at 7:16 AM

## 2021-11-11 ENCOUNTER — TELEPHONE (OUTPATIENT)
Dept: DIABETES SERVICES | Age: 71
End: 2021-11-11

## 2022-01-12 NOTE — PROGRESS NOTES
Patient was here for an office visit 2 days ago, she left without being seen, A1c 9.5%, I called the patient and spoke to her, she has not been watching the diet properly, increase the Lantus to 20 units, [facility], she will try to check the sugars better    As I am retiring she will find another physician, she was very thankful for the care for so many years

## 2022-04-21 NOTE — TELEPHONE ENCOUNTER
Patient has trouble with the vision cannot read small print can you check your blood sugars properly unable to control the diabetes  She is a good candidate for freestyle judith system which was ordered DC instructions

## (undated) DEVICE — SOLUTION IV IRRIG WATER 1000ML POUR BRL 2F7114

## (undated) DEVICE — SURGICAL PROCEDURE PACK CATRCT LT EYE BASIC CUST ST JOS LF

## (undated) DEVICE — STERILE POLYISOPRENE POWDER-FREE SURGICAL GLOVES: Brand: PROTEXIS

## (undated) DEVICE — Device

## (undated) DEVICE — ENCORE® LATEX MICRO SIZE 8.5, STERILE LATEX POWDER-FREE SURGICAL GLOVE: Brand: ENCORE